# Patient Record
Sex: MALE | Race: BLACK OR AFRICAN AMERICAN
[De-identification: names, ages, dates, MRNs, and addresses within clinical notes are randomized per-mention and may not be internally consistent; named-entity substitution may affect disease eponyms.]

---

## 2020-03-22 ENCOUNTER — HOSPITAL ENCOUNTER (EMERGENCY)
Dept: HOSPITAL 72 - EMR | Age: 55
LOS: 1 days | Discharge: HOME | End: 2020-03-23
Payer: MEDICAID

## 2020-03-22 VITALS — BODY MASS INDEX: 23.54 KG/M2 | WEIGHT: 150 LBS | HEIGHT: 67 IN

## 2020-03-22 DIAGNOSIS — G31.9: ICD-10-CM
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
## 2020-03-22 DIAGNOSIS — F17.200: ICD-10-CM

## 2020-03-22 DIAGNOSIS — M40.204: ICD-10-CM

## 2020-03-22 DIAGNOSIS — N13.30: ICD-10-CM

## 2020-03-22 DIAGNOSIS — I10: ICD-10-CM

## 2020-03-22 DIAGNOSIS — M51.27: ICD-10-CM

## 2020-03-22 DIAGNOSIS — S22.029A: Primary | ICD-10-CM
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
## 2020-03-22 DIAGNOSIS — Z98.1: ICD-10-CM

## 2020-03-22 DIAGNOSIS — I99.8: ICD-10-CM

## 2020-03-22 DIAGNOSIS — K57.90: ICD-10-CM

## 2020-03-22 DIAGNOSIS — M43.17: ICD-10-CM

## 2020-03-22 PROCEDURE — 72131 CT LUMBAR SPINE W/O DYE: CPT

## 2020-03-22 PROCEDURE — 99284 EMERGENCY DEPT VISIT MOD MDM: CPT

## 2020-03-22 PROCEDURE — 70450 CT HEAD/BRAIN W/O DYE: CPT

## 2020-03-22 PROCEDURE — 72128 CT CHEST SPINE W/O DYE: CPT

## 2020-03-22 PROCEDURE — 74176 CT ABD & PELVIS W/O CONTRAST: CPT

## 2020-03-22 NOTE — DIAGNOSTIC IMAGING REPORT
Indication: Headache

 

Technique: Contiguous 5 mm thick transaxial imaging of the head obtained in a Siemens

Sensation 64 slice CT scanner.  Soft tissue and bone windows generated.  Automatic

Exposure Control was utilized.

 

 

Total Dose length Product (DLP):  1045.5mGycm

 

CT Dose Index Volume (CTDIvol):   53.4 mGy

 

Comparison: none

 

Findings: There is mild prominence of the ventricles, basal cisterns, and cerebral

sulci consistent with atrophy. Mild, nonspecific, white matter hypoattenuation is

noted throughout the brain consistent with chronic small vessel disease. 

 

There is no midline shift, edema, acute hemorrhage, mass effect, or abnormal

extra-axial fluid collections. Bones are unremarkable.

 

Impression: No acute intracranial bleed, mass effect or edema.

 

Mild atrophy of the brain.

 

Nonspecific white matter hypoattenuation probably due to chronic small vessel

disease.

 

[facility] has communicated the preliminary results to the Emergency

Department.  Their findings are largely concordant with this report.

 

 

 

The CT scanner at [facility] is accredited by the American College of

Radiology and the scans are performed using dose optimization techniques as

appropriate to a performed exam including Automatic Exposure control.

## 2020-03-22 NOTE — DIAGNOSTIC IMAGING REPORT
INDICATION: Abdominal pain

 

TECHNIQUE: Continuous helical transaxial imaging of the abdomen and pelvis was

obtained from the lung bases to the pubic symphysis. No intravenous contrast was

administered. Coronal 2-D reformats were also obtained. Automatic Exposure Control

was utilized.

 

 

Total Dose length Product (DLP):  512.5 mGycm

 

CT Dose Index Volume (CTDIvol):   10.3 mGy

 

Comparison: none

 

FINDINGS: 

 

Lungs: The visualized lung bases are clear.

 

Liver: Grossly unremarkable

 

Gallbladder/biliary system: No gallstones are identified. There is no evidence of

intrahepatic or extrahepatic biliary ductal dilatation.

 

Spleen: Unremarkable

 

Pancreas: Unremarkable

 

Kidneys/Bladder: There is minimal right hydronephrosis without evidence of an

obstructing stone. There are no renal stones. The bladder is unremarkable..

 

Adrenal glands: Unremarkable

 

Bowel: Appendix is normal. There is no evidence of bowel obstruction. Diverticula

noted in the colon.

 

Aorta/IVC: Moderate calcification of aorta and iliac arteries demonstrated. There is

no aneurysm.

 

Peritoneum: There is no free fluid.

 

Bones: There is narrowing of intervertebral discs and accompanying endplate

osteophyte formation.  Hypertrophied facet joints also demonstrated. There is an

grade 2 anterolisthesis of L5 on S1 with bilateral pars interarticularis defects at

L5.

 

IMPRESSION:

 

Minimal right hydronephrosis. No renal calculus identified. Finding may be spurious.

Possibility of a recently passed stone or pyelonephritis may be considered. Correlate

clinically.

 

Diverticulosis of the colon.

 

Atherosclerotic vascular disease

 

Grade 2 spondylolisthesis L5 on S1. L5 spondylolysis.

 

 

 

Note:  Evaluation of solid organs is limited on non contrast imaging.

 

 

 

The CT scanner at [facility] is accredited by the American College of

Radiology and the scans are performed using dose optimization techniques as

appropriate to a performed exam including Automatic Exposure control.

## 2020-03-22 NOTE — DIAGNOSTIC IMAGING REPORT
Indication: Back pain

 

Technique: Continuous helical transaxial imaging of the lumbar spine was obtained.  

Coronal 2-D reformats were also obtained. Study obtained in a Siemens sensation 64

slice CT. 

 

Total Dose length Product (DLP):  494.8 mGycm

 

CT Dose Index Volume (CTDIvol):   14.2 mGy

 

 

Comparison: None

 

Findings: No acute fracture is identified. The L5-S1 level notable for grade 2

spondylolisthesis. There are bilateral moderately displaced pars interarticularis

defects at L5. Sclerosis and hypertrophy of the facets noted at this level. There is

narrowing and vacuum phenomenon of the L5-S1 disc. Mild marginal endplate spurs also

demonstrated throughout the lumbar spine and visualized lower thoracic spine. The

aorta is moderately calcified. Iliac artery calcifications noted.

 

IMPRESSION:

 

No acute injury appreciated.

 

Grade 2 spondylolisthesis L5 on S1 with moderate disc disease and facet arthropathy.

Bilateral displaced L5 spondylolysis.

 

[facility] has communicated the preliminary results to the Emergency

Department.  Their findings are largely concordant with this report.

 

 

 

The CT scanner at [facility] is accredited by the American College of

Radiology and the scans are performed using dose optimization techniques as

appropriate to a performed exam including Automatic Exposure control.

## 2020-03-22 NOTE — NUR
ED Nurse Note:



Recieved pt BIBA from [facility] with c/o mechanical fall, pt was found on curb, 
pt is awake, alert and oriented x 4, pt states he slipped on curb and could not 
get up and was laying there about 3 hours, pt has hx of spinal and neck surgery 
in december 2019, pt has severe fine tremors and states does drink alcohol, pt 
is completely soiled, all clothing removed, pt gowned, placed on cardiac 
monitoirng, will resume care as ordered, pt c/o pain to left hip and back area.
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
## 2020-03-23 VITALS — SYSTOLIC BLOOD PRESSURE: 136 MMHG | DIASTOLIC BLOOD PRESSURE: 81 MMHG

## 2020-03-23 VITALS — SYSTOLIC BLOOD PRESSURE: 129 MMHG | DIASTOLIC BLOOD PRESSURE: 90 MMHG

## 2020-03-23 NOTE — NUR
ED Nurse Note:



Pt has been dicaharged, allowed to sleep because homeless, will d/c in am when 
transportation is available, pt is sleeping, arouses easily, given water, 
tolerated well, pain level decreased, v/s stable, nad noted, will continue to 
monitor.

## 2020-08-25 ENCOUNTER — HOSPITAL ENCOUNTER (EMERGENCY)
Dept: HOSPITAL 72 - EMR | Age: 55
Discharge: ANOTHER HOSPITAL | End: 2020-08-25
Payer: MEDICAID

## 2020-08-25 VITALS — SYSTOLIC BLOOD PRESSURE: 188 MMHG | DIASTOLIC BLOOD PRESSURE: 123 MMHG

## 2020-08-25 VITALS — SYSTOLIC BLOOD PRESSURE: 197 MMHG | DIASTOLIC BLOOD PRESSURE: 121 MMHG

## 2020-08-25 VITALS — DIASTOLIC BLOOD PRESSURE: 111 MMHG | SYSTOLIC BLOOD PRESSURE: 155 MMHG

## 2020-08-25 VITALS — BODY MASS INDEX: 24.11 KG/M2 | HEIGHT: 66 IN | WEIGHT: 150 LBS

## 2020-08-25 VITALS — DIASTOLIC BLOOD PRESSURE: 122 MMHG | SYSTOLIC BLOOD PRESSURE: 186 MMHG

## 2020-08-25 DIAGNOSIS — G62.9: ICD-10-CM

## 2020-08-25 DIAGNOSIS — E83.42: ICD-10-CM

## 2020-08-25 DIAGNOSIS — M40.209: ICD-10-CM

## 2020-08-25 DIAGNOSIS — M51.24: ICD-10-CM

## 2020-08-25 DIAGNOSIS — K85.90: ICD-10-CM

## 2020-08-25 DIAGNOSIS — M47.817: ICD-10-CM

## 2020-08-25 DIAGNOSIS — D69.6: ICD-10-CM

## 2020-08-25 DIAGNOSIS — F10.239: Primary | ICD-10-CM

## 2020-08-25 DIAGNOSIS — I10: ICD-10-CM
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
## 2020-08-25 DIAGNOSIS — S80.211A: ICD-10-CM

## 2020-08-25 DIAGNOSIS — R00.0: ICD-10-CM

## 2020-08-25 DIAGNOSIS — R25.1: ICD-10-CM

## 2020-08-25 DIAGNOSIS — E83.52: ICD-10-CM

## 2020-08-25 DIAGNOSIS — E86.0: ICD-10-CM

## 2020-08-25 DIAGNOSIS — F41.9: ICD-10-CM

## 2020-08-25 LAB
ADD MANUAL DIFF: YES
ALBUMIN SERPL-MCNC: 4.2 G/DL (ref 3.4–5)
ALBUMIN/GLOB SERPL: 0.7 {RATIO} (ref 1–2.7)
ALP SERPL-CCNC: 65 U/L (ref 46–116)
ALT SERPL-CCNC: 62 U/L (ref 12–78)
ANION GAP SERPL CALC-SCNC: 21 MMOL/L (ref 5–15)
APTT BLD: 25 SEC (ref 23–33)
AST SERPL-CCNC: 237 U/L (ref 15–37)
BILIRUB SERPL-MCNC: 0.7 MG/DL (ref 0.2–1)
BUN SERPL-MCNC: 11 MG/DL (ref 7–18)
CALCIUM SERPL-MCNC: 10.2 MG/DL (ref 8.5–10.1)
CHLORIDE SERPL-SCNC: 98 MMOL/L (ref 98–107)
CK SERPL-CCNC: 466 U/L (ref 26–308)
CO2 SERPL-SCNC: 21 MMOL/L (ref 21–32)
CREAT SERPL-MCNC: 1.2 MG/DL (ref 0.55–1.3)
ERYTHROCYTE [DISTWIDTH] IN BLOOD BY AUTOMATED COUNT: 14.4 % (ref 11.6–14.8)
GLOBULIN SER-MCNC: 6 G/DL
HCT VFR BLD CALC: 43.5 % (ref 42–52)
HGB BLD-MCNC: 14.1 G/DL (ref 14.2–18)
INR PPP: 1 (ref 0.9–1.1)
MCV RBC AUTO: 97 FL (ref 80–99)
PHOSPHATE SERPL-MCNC: 3.7 MG/DL (ref 2.5–4.9)
PLATELET # BLD: 81 K/UL (ref 150–450)
POTASSIUM SERPL-SCNC: 3.9 MMOL/L (ref 3.5–5.1)
RBC # BLD AUTO: 4.47 M/UL (ref 4.7–6.1)
SODIUM SERPL-SCNC: 140 MMOL/L (ref 136–145)
WBC # BLD AUTO: 4.5 K/UL (ref 4.8–10.8)

## 2020-08-25 PROCEDURE — 80053 COMPREHEN METABOLIC PANEL: CPT

## 2020-08-25 PROCEDURE — 73562 X-RAY EXAM OF KNEE 3: CPT

## 2020-08-25 PROCEDURE — 74176 CT ABD & PELVIS W/O CONTRAST: CPT

## 2020-08-25 PROCEDURE — 82550 ASSAY OF CK (CPK): CPT

## 2020-08-25 PROCEDURE — 96366 THER/PROPH/DIAG IV INF ADDON: CPT

## 2020-08-25 PROCEDURE — 84100 ASSAY OF PHOSPHORUS: CPT

## 2020-08-25 PROCEDURE — 36415 COLL VENOUS BLD VENIPUNCTURE: CPT

## 2020-08-25 PROCEDURE — 96376 TX/PRO/DX INJ SAME DRUG ADON: CPT

## 2020-08-25 PROCEDURE — 72128 CT CHEST SPINE W/O DYE: CPT

## 2020-08-25 PROCEDURE — 85007 BL SMEAR W/DIFF WBC COUNT: CPT

## 2020-08-25 PROCEDURE — 83690 ASSAY OF LIPASE: CPT

## 2020-08-25 PROCEDURE — 85025 COMPLETE CBC W/AUTO DIFF WBC: CPT

## 2020-08-25 PROCEDURE — 83735 ASSAY OF MAGNESIUM: CPT

## 2020-08-25 PROCEDURE — 85730 THROMBOPLASTIN TIME PARTIAL: CPT

## 2020-08-25 PROCEDURE — 72125 CT NECK SPINE W/O DYE: CPT

## 2020-08-25 PROCEDURE — 90471 IMMUNIZATION ADMIN: CPT

## 2020-08-25 PROCEDURE — 96375 TX/PRO/DX INJ NEW DRUG ADDON: CPT

## 2020-08-25 PROCEDURE — 90715 TDAP VACCINE 7 YRS/> IM: CPT

## 2020-08-25 PROCEDURE — 70450 CT HEAD/BRAIN W/O DYE: CPT

## 2020-08-25 PROCEDURE — 96365 THER/PROPH/DIAG IV INF INIT: CPT

## 2020-08-25 PROCEDURE — 84484 ASSAY OF TROPONIN QUANT: CPT

## 2020-08-25 PROCEDURE — 72131 CT LUMBAR SPINE W/O DYE: CPT

## 2020-08-25 PROCEDURE — 85610 PROTHROMBIN TIME: CPT

## 2020-08-25 PROCEDURE — 99285 EMERGENCY DEPT VISIT HI MDM: CPT

## 2020-08-25 PROCEDURE — 93005 ELECTROCARDIOGRAM TRACING: CPT

## 2020-08-25 NOTE — DIAGNOSTIC IMAGING REPORT
Indication: Pain, fall, lower and upper extremity right greater than left weakness

and cramping

 

Technique: Spiral acquisitions obtained through the thoracic spine. No IV contrast

utilized. Multiplanar reconstructions were generated.  Total dose length product 436

mGycm. CTDIvol(s) 10 mGy.  Dose reduction achieved using automated exposure control

 

 

Comparison: 3/22/2020

 

Findings: Since prior exam, there is increased acute focal kyphotic angulation at

T1-T2, and the anterior corner of the T1 vertebral body now impacts the superior

endplate of the T2 vertebral body. The anterior corner is displaced posteriorly by 8

6 mm from the posterior corner. The posterior corner of the T1 vertebral body is

likewise displaced approximately 8 mm posteriorly relative to the T2 vertebral body.

There is interim significant loss of the T1-T2 disc space, considerable interim

vacuum formation, and considerable irregularity of the inferior T1 and superior T2

endplates. There also appears to be increased proliferative change. There is

questionable disc material protruding posterior to the posterior inferior corner of

T1, further narrowing the spinal canal, although this could be artifactual as there

is considerable streak and beam hardening artifact in this area. On the axial views,

the spinal canal bone appears borderline narrowing, about 10 mm, but appears more

significantly narrowed, possibly 8 mm, on the sagittal reconstructed images.

 

Again demonstrated is a fracture of the left T1 transverse process. Again

demonstrated is a fracture of the left T1 lamina. This is also demonstrated

previously, fracture margins appearing somewhat more irregular than on the prior

exam. Alignment is stable. There is what appears to be a fracture of the right T1

pedicle, also evident previously, more apparent on this exam than on a cervical spine

CT performed at same time. Again demonstrated is a fracture of the right T1

transverse process, unhealed but alignment stable. 

 

There is evidence of interim healing of previously reported left T2 transverse

process fracture. As mentioned above, there is T2 endplate irregularity related to

the T1 subluxation. The T2 segment is intact otherwise.

 

The remaining vertebral segments demonstrate no evidence of fracture. The remaining

bony alignment is normal.

 

At T9-10, there is a small posterior central osteophyte which may impinge slightly on

the anterior spinal canal but does not significantly narrow it.

 

At T7-8, there is a right paracentral posterior osteophyte which may impinge slightly

on the right lateral aspect of the spinal canal but does not significantly narrow it

 

At the remaining disc levels, no significant disc bulge or protrusion, spinal

stenosis, or neural foraminal stenosis.

 

The included extraspinal soft tissues are unremarkable.

 

Impression: Since 3/22/2020, interim posterior displacement of the T2 vertebral body

posteriorly relative to T3 by about 8 mm, and interim development of acute kyphotic

angulation at this level. This is probably not acute, as there is considerable

secondary degenerative change of T2 and T3 which has developed in the interim. The

posterior T1 vertebral body inferior corner protrudes into the spinal canal as a

result. This by itself results in only borderline stenosis of the spinal canal, but

there may be disc material (versus artifact) also protruding posteriorly which if

real results in moderate narrowing of the spinal canal at this level.

 

T1 posterior element fractures, mostly ununited, as detailed above. There is probably

an ununited right pedicular fracture, better demonstrated on this study than on

separate CT cervical spine

 

Mild degenerative changes, as detailed on a level by level basis above

 

Findings previously discussed by phone with Dr. Cohn in the emergency room.

 

The CT scanner at [facility] is accredited by the American College of

Radiology and the scans are performed using protocols designed to limit radiation

exposure to as low as reasonably achievable to attain images of sufficient resolution

adequate for diagnostic evaluation.
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
## 2020-08-25 NOTE — DIAGNOSTIC IMAGING REPORT
Indications: Altered mental status, status post fall

 

Technique: Spiral acquisitions obtained through the brain. Angled axial and coronal 5

x 5 mm slices were reconstructed. Total dose length product 1072 mGycm.  CTDI vol(s)

53 mGy. Dose reduction achieved using automated exposure control

 

Comparison: 3/22/2020

 

Findings: There is enlargement of the ventricles and extra-axial CSF spaces, out of

proportion to patient's age. There is periventricular deep white matter

low-attenuation, consistent with chronic microvascular ischemic change. No acute

intracranial hemorrhage or edema. No mass effect nor midline shift. Visualized orbits

and sinuses are unremarkable. The mastoids are clear. The calvarium is intact. Fusion

hardware is seen in the upper cervical spine No significant interim change

 

Impression: Cortical and central volume loss, also previously reported, out of

proportion to age

 

Negative for acute intracranial bleed or mass effect

 

Periventricular deep white matter hypoattenuation, consistent with chronic

microvascular ischemic change

 

 

 

The CT scanner at [facility] is accredited by the [employer] College of

Radiology and the scans are performed using protocols designed to limit radiation

exposure to as low as reasonably achievable to attain images of sufficient resolution

adequate for diagnostic evaluation.

## 2020-08-25 NOTE — DIAGNOSTIC IMAGING REPORT
Indication: Pain, status post fall

 

Technique: Spiral acquisitions obtained through the cervical spine. No IV contrast

utilized. Multiplanar reconstructions were generated.  Total dose length product 514

mGycm. CTDIvol(s) 20 mGy. Dose reduction achieved using automated exposure control.

 

 

Comparison: None. Reference is made to prior thoracic spine CT of 3/22/2020

 

Findings: The patient is status post posterior fusion, with fusion hardware extending

from C2 through T1. There is also evidence of laminectomies of C3-T1. Since the prior

exam, there is increased acute focal kyphotic angulation at T1-T2, and the anterior

corner of the T1 vertebral body now impacts the superior endplate of the T2 vertebral

body. The anterior corner is displaced posteriorly by 8 6 mm from the posterior

corner. The posterior corner of the T1 vertebral body is likewise displaced

approximately 8 mm posteriorly relative to the T2 vertebral body. There is interim

significant loss of the T1-T2 disc space, considerable interim vacuum formation, and

considerable irregularity of the inferior T1 and superior T2 endplates. There also

appears to be increased proliferative change. There is questionable disc material

protruding posterior to the posterior inferior corner of T1, further narrowing the

spinal canal, although this could be artifactual as there is considerable streak and

beam hardening artifact in this area. On the axial views, the spinal canal bone

appears borderline narrowing, about 10 mm, but appears more significantly narrowed,

possibly 8 mm, on the sagittal reconstructed images.

 

Again demonstrated is a fracture of the left T1 transverse process. Again

demonstrated is a fracture of the left T1 lamina. This is also demonstrated

previously, fracture margins appearing somewhat more irregular than on the prior

exam. Alignment is stable. Prior exam demonstrates a questionable right T1 pedicular

fracture. This appears to be evident on CT of the thoracic spine performed at the

same time as this exam, but is less apparent on the cervical spine CT.. Again

demonstrated is a fracture of the right T1 transverse process, unhealed but alignment

stable. 

 

At the remaining levels, the fusion hardware for the most part appears well aligned,

intrapedicular, without significant foraminal encroachment no pedicle screws are seen

at C7, and the T1 pedicle screws extend all the way into the anterior vertebral body.

The right screw appears to be entirely interpedicular. The left screw may encroach

slightly into the neural foramen, but probably not a significant extent. The

alignment of the cervical segments appears normal. No evidence of acute cervical

fracture. There is degenerative narrowing of the C6-7 and C7-T1 discs. Artifact from

the hardware obscures the spinal canal, and therefore spinal stenosis at any level

cannot be ruled out although most of the cervical spine is decompressed. High

attenuation is seen posterior to the cord at the C3 level. It is not completely clear

whether this is artifact or bone; most likely the former, but if the latter, this may

narrow the spinal canal to 7 mm at this level.

 

At C4-5, there is moderate narrowing of the left neural foramen due to facet

arthrosis.

 

At C5-6, there is moderate to severe left and mild-to-moderate right neural foraminal

stenosis due to facet arthrosis.

 

At C6-7, there is mild bilateral neural foraminal stenosis. At arthrosis.

 

Included extra spinal soft tissues are unremarkable.

 

Impression: Since 3/22/2020, interim posterior displacement of the T2 vertebral body

posteriorly relative to T3 by about 8 mm, and interim development of acute kyphotic

angulation at this level. This is probably not acute, as there is considerable

secondary degenerative change of T2 and T3 which has developed in the interim. The

posterior T1 vertebral body inferior corner protrudes into the spinal canal as a

result. This by itself results in only borderline stenosis of the spinal canal, but

there may be disc material (versus artifact) also protruding posteriorly which if

real results in moderate narrowing of the spinal canal at this level.

 

Extensive postsurgical changes, of the cervical and upper thoracic spine, as detailed

above

 

T1 posterior element fractures, mostly ununited, as detailed above. There is

questionably an ununited right pedicular fracture, better demonstrated on separate

thoracic spine CT, suspect real

 

Suboptimal visualization of the spinal canal, due to streak artifact from the

hardware. Doubt significant spinal stenosis but spinal stenosis at C3 level cannot be

completely ruled out.

 

Findings discussed by phone with Dr. Cohn in the emergency room at the time of

interpretation

 

The CT scanner at [facility] is accredited by the American College of

[employer]iology and the scans are performed using protocols designed to limit radiation

exposure to as low as reasonably achievable to attain images of sufficient resolution

adequate for diagnostic evaluation.

## 2020-08-25 NOTE — DIAGNOSTIC IMAGING REPORT
Indications: Trauma, pain, status post fall

 

Technique: Spiral acquisitions obtained through the lumbar spine. Multiplanar

reconstructions were generated. No IV contrast utilized. Total dose length product

273 mGycm. CTDIvol(s) 5 mGy.  Dose reduction achieved using automated exposure

control

 

 

Comparison: 3/22/2020  

 

Findings: There is bilateral L5 spondylolysis. There is grade 2 L5 on S1

spondylolisthesis and secondary degenerative change. The degree of spondylolisthesis

appears slightly increased from the previous study. The degenerative changes appear

similar.

 

The remaining bony alignment is normal. Vertebral body heights are preserved. The

remaining disc spaces are preserved, although there is mild vacuum formation at L3-4.

There is multilevel degenerative proliferative change. No acute fractures. There are

degenerative changes of the sacroiliac joints.

 

At L3-4, there is circumferential annular bulge. This this does not result in any

significant spinal canal compromise, however. This may slightly compromise bilateral

neural foramina.

 

At L4-5, there is circumferential annular bulge. This does not significantly narrow

the spinal canal, may slightly compromise bilateral neural foramina.

 

At L5-S1, the alignment abnormality does not appear to significantly compromise the

spinal canal. However, there may be moderate compromise of the bilateral neural

foramina as a result.

 

The included extraspinal soft tissues are unremarkable.

 

Impression: No acute bony trauma

 

Bilateral L5 spondylolysis. Grade 2 spondylolisthesis of L5 and S1, slightly

progressive since previous exam. Associated secondary degenerative change

 

 

 

The CT scanner at [facility] is accredited by the American College of

Radiology and the scans are performed using protocols designed to limit radiation

exposure to as low as reasonably achievable to attain images of sufficient resolution

adequate for diagnostic evaluation.

## 2020-08-25 NOTE — DIAGNOSTIC IMAGING REPORT
Indication: Pain, status post fall

 

Technique: 3 views of the right knee

 

Comparison: None

 

Findings: Exam is limited, no true lateral view available. No acute fractures. No

dislocations. No suprapatellar effusion. There is minimal medial compartmental

degenerative joint space narrowing. The joint spaces are preserved otherwise.

 

Impression: Minimal degenerative changes. No acute bony trauma

## 2020-08-25 NOTE — DIAGNOSTIC IMAGING REPORT
Indication: Pain, status post fall

 

Technique: Spiral acquisitions obtained through the abdomen and pelvis. No oral

contrast utilized, per emergency room physician request No IV contrast utilized,  per

referring physician request.. Multiplanar reconstructions were generated. Total dose

length product 273 mGycm. CTDIvol(s) 5 mGy. Dose reduction achieved using automated

exposure control

 

 

Comparison: 3/22/2020

 

Findings: Lack of enteric contrast limits assessment of the GI tract. The appendix is

normal. No evidence of diverticulosis or diverticulitis. No small bowel distention.

No free or loculated intraperitoneal gas or fluid is evident. Distal esophagus

demonstrates a 10 x 6 mm radiopaque foreign body within the lumen at the

gastroesophageal junction.

 

There is a mass in the left upper quadrant of the abdomen which measures 6 cm long

axis dimension. This is anterior to the upper pole of the left kidney and medial to

the splenic flexure the colon. This is not evident previously. This demonstrates some

infiltration of the surrounding fat. There is a second mass in the left upper

quadrant, immediately adjacent to just above the splenic flexure the colon which is

bilobed, measures 3.5 cm long axis dimension. This is likewise not evident

previously. There is a prominent node measuring 2.4 x 11.8 cm just cephalad posterior

to the pancreatic head body junction which appears unchanged from the previous exam.

 

Lack of IV contrast limits assessment of the solid organs. The liver is diffusely

hypoattenuating, consistent with fatty change. No gross focal abnormality

demonstrated. The gallbladder, bile ducts, pancreas, spleen are unremarkable. A 7 mm

nodule is seen in the anterior limb of the left adrenal, probably evident previously.

There is bilateral perinephric fat stranding, which appears slightly more extensive

than on the prior exam. No renal or ureteral calculi, hydronephrosis, or hydroureter.

No pelvic mass or adenopathy. The bladder is unremarkable.

 

The included lung bases are clear. The bones are unremarkable for bilateral L5

spondylolysis, grade 1 L5 on S1 spondylolisthesis, and secondary degenerative

changes. There are degenerative changes of the lumbar spine

 

Impression: Limited exam, due to lack of enteric and IV contrast

 

2 masses in the left upper quadrant, as described, one possibly cystic, the other

solid. These are new since the previous exam. Consider contrast CT or MRI to better

characterize

 

Increased bilateral perinephric fat stranding, SPECT chronic renal inflammation is

not excludable

 

Bilateral L5 spondylolysis, grade 1 L5 on S1 spondylolisthesis and secondary

degenerative change

 

7 mm left adrenal nodule, too small to characterize, probably evident previously

 

Findings discussed by phone with Dr. Cohn in the emergency room at the time of

interpretation

 

The CT scanner at [facility] is accredited by the American College of

Radiology and the scans are performed using protocols designed to limit radiation

exposure to as low as reasonably achievable to attain images of sufficient resolution

adequate for diagnostic evaluation.

## 2020-09-03 ENCOUNTER — HOSPITAL ENCOUNTER (INPATIENT)
Dept: HOSPITAL 72 - EMR | Age: 55
LOS: 11 days | Discharge: NURSING, REHAB OR LONG-TERM CARE | DRG: 720 | End: 2020-09-14
Payer: MEDICAID

## 2020-09-03 VITALS — DIASTOLIC BLOOD PRESSURE: 95 MMHG | SYSTOLIC BLOOD PRESSURE: 175 MMHG

## 2020-09-03 VITALS — HEIGHT: 67 IN | BODY MASS INDEX: 27.37 KG/M2 | WEIGHT: 174.39 LBS

## 2020-09-03 VITALS — SYSTOLIC BLOOD PRESSURE: 163 MMHG | DIASTOLIC BLOOD PRESSURE: 96 MMHG

## 2020-09-03 DIAGNOSIS — I10: ICD-10-CM

## 2020-09-03 DIAGNOSIS — E44.1: ICD-10-CM

## 2020-09-03 DIAGNOSIS — N39.0: ICD-10-CM

## 2020-09-03 DIAGNOSIS — A41.9: Primary | ICD-10-CM

## 2020-09-03 DIAGNOSIS — S30.810A: ICD-10-CM

## 2020-09-03 DIAGNOSIS — I38: ICD-10-CM

## 2020-09-03 DIAGNOSIS — Z59.0: ICD-10-CM

## 2020-09-03 DIAGNOSIS — F10.239: ICD-10-CM

## 2020-09-03 DIAGNOSIS — R65.20: ICD-10-CM

## 2020-09-03 DIAGNOSIS — R53.1: ICD-10-CM

## 2020-09-03 DIAGNOSIS — T14.8XXA: ICD-10-CM

## 2020-09-03 DIAGNOSIS — B19.20: ICD-10-CM
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
## 2020-09-03 DIAGNOSIS — Z20.828: ICD-10-CM

## 2020-09-03 LAB
ADD MANUAL DIFF: NO
ALBUMIN SERPL-MCNC: 3 G/DL (ref 3.4–5)
ALBUMIN/GLOB SERPL: 0.6 {RATIO} (ref 1–2.7)
ALP SERPL-CCNC: 57 U/L (ref 46–116)
ALT SERPL-CCNC: 40 U/L (ref 12–78)
ANION GAP SERPL CALC-SCNC: 12 MMOL/L (ref 5–15)
APPEARANCE UR: CLEAR
APTT PPP: (no result) S
AST SERPL-CCNC: 46 U/L (ref 15–37)
BASOPHILS NFR BLD AUTO: 1.4 % (ref 0–2)
BILIRUB SERPL-MCNC: 0.2 MG/DL (ref 0.2–1)
BUN SERPL-MCNC: 17 MG/DL (ref 7–18)
CALCIUM SERPL-MCNC: 10.2 MG/DL (ref 8.5–10.1)
CHLORIDE SERPL-SCNC: 103 MMOL/L (ref 98–107)
CO2 SERPL-SCNC: 22 MMOL/L (ref 21–32)
CREAT SERPL-MCNC: 1.2 MG/DL (ref 0.55–1.3)
EOSINOPHIL NFR BLD AUTO: 0.2 % (ref 0–3)
ERYTHROCYTE [DISTWIDTH] IN BLOOD BY AUTOMATED COUNT: 14.6 % (ref 11.6–14.8)
GLOBULIN SER-MCNC: 5.4 G/DL
GLUCOSE UR STRIP-MCNC: NEGATIVE MG/DL
HCT VFR BLD CALC: 36.1 % (ref 42–52)
HGB BLD-MCNC: 12.1 G/DL (ref 14.2–18)
KETONES UR QL STRIP: (no result)
LEUKOCYTE ESTERASE UR QL STRIP: NEGATIVE
LYMPHOCYTES NFR BLD AUTO: 4.1 % (ref 20–45)
MCV RBC AUTO: 95 FL (ref 80–99)
MONOCYTES NFR BLD AUTO: 11.6 % (ref 1–10)
NEUTROPHILS NFR BLD AUTO: 82.6 % (ref 45–75)
NITRITE UR QL STRIP: NEGATIVE
PH UR STRIP: 5 [PH] (ref 4.5–8)
PLATELET # BLD: 258 K/UL (ref 150–450)
POTASSIUM SERPL-SCNC: 3.7 MMOL/L (ref 3.5–5.1)
PROT UR QL STRIP: (no result)
RBC # BLD AUTO: 3.78 M/UL (ref 4.7–6.1)
SODIUM SERPL-SCNC: 137 MMOL/L (ref 136–145)
SP GR UR STRIP: 1.01 (ref 1–1.03)
UROBILINOGEN UR-MCNC: NORMAL MG/DL (ref 0–1)
WBC # BLD AUTO: 17.7 K/UL (ref 4.8–10.8)

## 2020-09-03 PROCEDURE — 81001 URINALYSIS AUTO W/SCOPE: CPT

## 2020-09-03 PROCEDURE — 70553 MRI BRAIN STEM W/O & W/DYE: CPT

## 2020-09-03 PROCEDURE — 87536 HIV-1 QUANT&REVRSE TRNSCRPJ: CPT

## 2020-09-03 PROCEDURE — 86803 HEPATITIS C AB TEST: CPT

## 2020-09-03 PROCEDURE — 99291 CRITICAL CARE FIRST HOUR: CPT

## 2020-09-03 PROCEDURE — 96365 THER/PROPH/DIAG IV INF INIT: CPT

## 2020-09-03 PROCEDURE — 87081 CULTURE SCREEN ONLY: CPT

## 2020-09-03 PROCEDURE — 85025 COMPLETE CBC W/AUTO DIFF WBC: CPT

## 2020-09-03 PROCEDURE — 93306 TTE W/DOPPLER COMPLETE: CPT

## 2020-09-03 PROCEDURE — 83605 ASSAY OF LACTIC ACID: CPT

## 2020-09-03 PROCEDURE — 87086 URINE CULTURE/COLONY COUNT: CPT

## 2020-09-03 PROCEDURE — 86703 HIV-1/HIV-2 1 RESULT ANTBDY: CPT

## 2020-09-03 PROCEDURE — 86140 C-REACTIVE PROTEIN: CPT

## 2020-09-03 PROCEDURE — 87590 N.GONORRHOEAE DNA DIR PROB: CPT

## 2020-09-03 PROCEDURE — 76937 US GUIDE VASCULAR ACCESS: CPT

## 2020-09-03 PROCEDURE — 84100 ASSAY OF PHOSPHORUS: CPT

## 2020-09-03 PROCEDURE — 86709 HEPATITIS A IGM ANTIBODY: CPT

## 2020-09-03 PROCEDURE — 93005 ELECTROCARDIOGRAM TRACING: CPT

## 2020-09-03 PROCEDURE — 85651 RBC SED RATE NONAUTOMATED: CPT

## 2020-09-03 PROCEDURE — 96375 TX/PRO/DX INJ NEW DRUG ADDON: CPT

## 2020-09-03 PROCEDURE — 87040 BLOOD CULTURE FOR BACTERIA: CPT

## 2020-09-03 PROCEDURE — 87340 HEPATITIS B SURFACE AG IA: CPT

## 2020-09-03 PROCEDURE — 86705 HEP B CORE ANTIBODY IGM: CPT

## 2020-09-03 PROCEDURE — 86592 SYPHILIS TEST NON-TREP QUAL: CPT

## 2020-09-03 PROCEDURE — 82150 ASSAY OF AMYLASE: CPT

## 2020-09-03 PROCEDURE — 87205 SMEAR GRAM STAIN: CPT

## 2020-09-03 PROCEDURE — 85007 BL SMEAR W/DIFF WBC COUNT: CPT

## 2020-09-03 PROCEDURE — 81003 URINALYSIS AUTO W/O SCOPE: CPT

## 2020-09-03 PROCEDURE — 87070 CULTURE OTHR SPECIMN AEROBIC: CPT

## 2020-09-03 PROCEDURE — 80202 ASSAY OF VANCOMYCIN: CPT

## 2020-09-03 PROCEDURE — 36569 INSJ PICC 5 YR+ W/O IMAGING: CPT

## 2020-09-03 PROCEDURE — 87491 CHLMYD TRACH DNA AMP PROBE: CPT

## 2020-09-03 PROCEDURE — 87181 SC STD AGAR DILUTION PER AGT: CPT

## 2020-09-03 PROCEDURE — 80048 BASIC METABOLIC PNL TOTAL CA: CPT

## 2020-09-03 PROCEDURE — 36415 COLL VENOUS BLD VENIPUNCTURE: CPT

## 2020-09-03 PROCEDURE — 83690 ASSAY OF LIPASE: CPT

## 2020-09-03 PROCEDURE — 70450 CT HEAD/BRAIN W/O DYE: CPT

## 2020-09-03 PROCEDURE — 71045 X-RAY EXAM CHEST 1 VIEW: CPT

## 2020-09-03 PROCEDURE — 80053 COMPREHEN METABOLIC PANEL: CPT

## 2020-09-03 PROCEDURE — 84484 ASSAY OF TROPONIN QUANT: CPT

## 2020-09-03 PROCEDURE — 83735 ASSAY OF MAGNESIUM: CPT

## 2020-09-03 SDOH — ECONOMIC STABILITY - HOUSING INSECURITY: HOMELESSNESS: Z59.0

## 2020-09-03 NOTE — NUR
TRANSFER TO FLOOR:



Patient transferred to ICU as ordered, per ERMD.   Report given to DA Nieves. 
Went over all belongings with DA Nieves. Medications logged in ER med room. 
Patient transported via gurney with ACLS protocol on cardiac monitor in stable 
condition accompanied by 1 RN and ER tech.

## 2020-09-03 NOTE — EMERGENCY ROOM REPORT
History of Present Illness


General


Chief Complaint:  Shortness of breath


Source:  Patient





Present Illness


HPI


55-year-old male history of alcohol abuse quit alcohol use 2 weeks ago presents 

with fever chills  palpitations, started today, patient was recently discharged 

yesterday from outside hospital, patient reports dry cough, no chest pain or 

shortness of breath no dysuria, no abdominal pain no known aggravating 

relieving factors severity is moderate, constant patient presents for 

evaluation and treatment.


Allergies:  


Coded Allergies:  


     No Known Allergies (Unverified , 11/18/15)





COVID-19 Screening


Contact w/high risk pt:  No


Recent Travel to affected area:  No


Experienced COVID-19 symptoms?:  No





Patient History


Past Medical History:  see triage record


Reviewed Nursing Documentation:  PMH: Agreed; PSxH: Agreed





Nursing Documentation-PMH


Hx Hypertension:  Yes





Review of Systems


All Other Systems:  negative except mentioned in HPI





Physical Exam


Febrile, heart rate greater than 100


Sp02 EP Interpretation:  reviewed, normal


General Appearance:  alert, moderate distress


Head:  normocephalic, atraumatic


Eyes:  bilateral eye PERRL, bilateral eye EOMI


ENT:  uvula midline, dry mucus membranes


Neck:  supple, thyroid normal, supple/symm/no masses


Respiratory:  lungs clear, no respiratory distress, no retraction, no accessory 

muscle use


Cardiovascular #1:  normal peripheral pulses, no edema, no gallop, no murmur, 

tachycardia


Gastrointestinal:  non tender, soft, no guarding, no rebound


Musculoskeletal:  normal inspection


Neurologic:  alert, oriented x3


Psychiatric:  mood/affect normal


Skin:  no rash, warm/dry





Procedures


Critical Care Time


Critical Care Time


Given the critical condition in which the patient arrived, the patient was 

immediately assessed by myself and the nurse, and cardiac monitoring initiated 

due to the potential for rapid decompensation of the patient's clinical 

condition. During the course of the patient's stay, I spent a considerable 

amount of time at the bedside performing serial re-evaluations of the patient's 

hemodynamic and clinical status because of the recognized potential threat to 

life or limb in this condition. I then had a chance to review not only all of 

the available current laboratory and radiographic studies obtained today, but I 

also reviewed old records available to me at the time. Additionally, any 

ancillary information available including paramedic records were reviewed. 

Sequential vital signs were obtained. 





Critical Care time of 37 minutes was performed exclusive of billable procedures.





Medical Decision Making


Diagnostic Impression:  


 Primary Impression:  


 Sepsis


 Qualified Codes:  A41.9 - Sepsis, unspecified organism


 Additional Impression:  


 Alcohol withdrawal


 Qualified Codes:  F10.230 - Alcohol dependence with withdrawal, uncomplicated


ER Course


Please note this chart was retrospectively transcribed after downtime 

resolution 


55-year-old male presents with alcohol withdrawal, possible sepsis, tachycardic

, hypertensive.  Patient states last drink was 7 days ago patient apparently is 

a heavy consumptive alcohol abuser cannot remember if he has a history of 

alcohol withdrawal


Patient during his hospitalization began to be hypertensive in the 200s 

tachycardic to the 160s, becoming more altered patient required 230 mg of 

phenobarbital in addition to the 4 mg of Ativan patient was started on broad-

spectrum antibiotics given 3 L of fluid cefepime and Vanco were started


COVID was negative, plan for admission to ICU, Versed drip will be started


Patient admitted to Dr. Marrufo





Laboratory Tests








Test


  9/3/20


18:10 9/3/20


18:40 9/3/20


20:17


 


Serum Alcohol < 3 mg/dL    


 


White Blood Count


  


  17.7 K/UL


(4.8-10.8)  H 


 


 


Red Blood Count


  


  3.78 M/UL


(4.70-6.10)  L 


 


 


Hemoglobin


  


  12.1 G/DL


(14.2-18.0)  L 


 


 


Hematocrit


  


  36.1 %


(42.0-52.0)  L 


 


 


Mean Corpuscular Volume  95 FL (80-99)   


 


Mean Corpuscular Hemoglobin


  


  32.0 PG


(27.0-31.0)  H 


 


 


Mean Corpuscular Hemoglobin


Concent 


  33.5 G/DL


(32.0-36.0) 


 


 


Red Cell Distribution Width


  


  14.6 %


(11.6-14.8) 


 


 


Platelet Count


  


  258 K/UL


(150-450) 


 


 


Mean Platelet Volume


  


  7.8 FL


(6.5-10.1) 


 


 


Neutrophils (%) (Auto)


  


  82.6 %


(45.0-75.0)  H 


 


 


Lymphocytes (%) (Auto)


  


  4.1 %


(20.0-45.0)  L 


 


 


Monocytes (%) (Auto)


  


  11.6 %


(1.0-10.0)  H 


 


 


Eosinophils (%) (Auto)


  


  0.2 %


(0.0-3.0) 


 


 


Basophils (%) (Auto)


  


  1.4 %


(0.0-2.0) 


 


 


Sodium Level


  


  137 MMOL/L


(136-145) 


 


 


Potassium Level


  


  3.7 MMOL/L


(3.5-5.1) 


 


 


Chloride Level


  


  103 MMOL/L


() 


 


 


Carbon Dioxide Level


  


  22 MMOL/L


(21-32) 


 


 


Anion Gap


  


  12 mmol/L


(5-15) 


 


 


Blood Urea Nitrogen


  


  17 mg/dL


(7-18) 


 


 


Creatinine


  


  1.2 MG/DL


(0.55-1.30) 


 


 


Estimated Glomerular


Filtration Rate 


  > 60 mL/min


(>60) 


 


 


Glucose Level


  


  114 MG/DL


()  H 


 


 


Lactic Acid Level


  


  0.50 mmol/L


(0.4-2.0) 


 


 


Calcium Level


  


  10.2 MG/DL


(8.5-10.1)  H 


 


 


Total Bilirubin


  


  0.2 MG/DL


(0.2-1.0) 


 


 


Aspartate Amino Transferase


(AST) 


  46 U/L (15-37)


H 


 


 


Alanine Aminotransferase (ALT)


  


  40 U/L (12-78)


  


 


 


Alkaline Phosphatase


  


  57 U/L


() 


 


 


Troponin I


  


  0.000 ng/mL


(0.000-0.056) 


 


 


Total Protein


  


  8.4 G/DL


(6.4-8.2)  H 


 


 


Albumin


  


  3.0 G/DL


(3.4-5.0)  L 


 


 


Globulin  5.4 g/dL   


 


Albumin/Globulin Ratio


  


  0.6 (1.0-2.7)


L 


 


 


Urine Color   Pale yellow  


 


Urine Appearance   Clear  


 


Urine pH   5.0 (4.5-8.0)  


 


Urine Specific Gravity


  


  


  1.010


(1.005-1.035)


 


Urine Protein


  


  


  1+ (NEGATIVE)


H


 


Urine Glucose (UA)


  


  


  Negative


(NEGATIVE)


 


Urine Ketones


  


  


  1+ (NEGATIVE)


H


 


Urine Blood


  


  


  Negative


(NEGATIVE)


 


Urine Nitrite


  


  


  Negative


(NEGATIVE)


 


Urine Bilirubin


  


  


  Negative


(NEGATIVE)


 


Urine Urobilinogen


  


  


  Normal MG/DL


(0.0-1.0)


 


Urine Leukocyte Esterase


  


  


  Negative


(NEGATIVE)


 


Urine RBC


  


  


  0-2 /HPF (0 -


0)  H


 


Urine WBC


  


  


  10-15 /HPF (0


- 0)  H


 


Urine Squamous Epithelial


Cells 


  


  None /LPF


(NONE/OCC)


 


Urine Bacteria


  


  


  Occasional


/HPF (NONE)








Microbiology








 Date/Time


Source Procedure


Growth Status


 


 


 9/3/20 18:40


Nasopharynx SARS-CoV-2 RdRp Gene Assay - Final Complete








EKG Diagnostic Results


EKG Time:  18:11


EP Interpretation:  Sinus tachycardia, rate 132, QTc 403, no acute ST elevations

, normal axis





Rhythm Strip Diag. Results


Rhythm Strip Time:  21:24


EP Interpretation:  yes


Rate:  143


Rhythm:  other - Sinus tachycardia





Chest X-Ray Diagnostic Results


Chest X-Ray Diagnostic Results :  


   Chest X-Ray Ordered:  Yes


   # of Views/Limited/Complete:  1 View


   Indication:  Chest Pain


   EP Interpretation:  Yes


   Interpretation:  no consolidation, no effusion, no pneumothorax, no acute 

cardiopulmonary disease


   Impression:  No acute disease


   Electronically Signed by:  Emory Small MD


Disposition:  ADMITTED AS INPATIENT


Condition:  Critical


Referrals:  


[facility],REFERRING (PCP)











Emory Small MD Sep 3, 2020 19:31

## 2020-09-03 NOTE — NUR
ED Nurse Note:



Recieved report from DA Del Rosario. Patient sleeping in bed, no acute distress 
noted.

## 2020-09-03 NOTE — NUR
ED Nurse Note:



Pt BIBA [id number] from [place] c/o alcohol withdrawal- tremors, unsteady gait. Pt was 
dc from [facility] yesterday for the same sx. AAOx4, verbally 
responsive. Temp 104.3F, orally. No SOB, on rooma ir. Warm to touch. Pt placed 
on cardiac monitor. ERMD at bedside.

## 2020-09-04 VITALS — SYSTOLIC BLOOD PRESSURE: 129 MMHG | DIASTOLIC BLOOD PRESSURE: 83 MMHG

## 2020-09-04 VITALS — DIASTOLIC BLOOD PRESSURE: 73 MMHG | SYSTOLIC BLOOD PRESSURE: 109 MMHG

## 2020-09-04 VITALS — SYSTOLIC BLOOD PRESSURE: 146 MMHG | DIASTOLIC BLOOD PRESSURE: 92 MMHG

## 2020-09-04 VITALS — SYSTOLIC BLOOD PRESSURE: 122 MMHG | DIASTOLIC BLOOD PRESSURE: 77 MMHG

## 2020-09-04 VITALS — DIASTOLIC BLOOD PRESSURE: 80 MMHG | SYSTOLIC BLOOD PRESSURE: 129 MMHG

## 2020-09-04 VITALS — SYSTOLIC BLOOD PRESSURE: 129 MMHG | DIASTOLIC BLOOD PRESSURE: 80 MMHG

## 2020-09-04 VITALS — SYSTOLIC BLOOD PRESSURE: 115 MMHG | DIASTOLIC BLOOD PRESSURE: 72 MMHG

## 2020-09-04 VITALS — DIASTOLIC BLOOD PRESSURE: 81 MMHG | SYSTOLIC BLOOD PRESSURE: 131 MMHG

## 2020-09-04 VITALS — SYSTOLIC BLOOD PRESSURE: 133 MMHG | DIASTOLIC BLOOD PRESSURE: 82 MMHG

## 2020-09-04 VITALS — DIASTOLIC BLOOD PRESSURE: 70 MMHG | SYSTOLIC BLOOD PRESSURE: 112 MMHG

## 2020-09-04 VITALS — DIASTOLIC BLOOD PRESSURE: 81 MMHG | SYSTOLIC BLOOD PRESSURE: 125 MMHG

## 2020-09-04 VITALS — SYSTOLIC BLOOD PRESSURE: 125 MMHG | DIASTOLIC BLOOD PRESSURE: 81 MMHG

## 2020-09-04 VITALS — DIASTOLIC BLOOD PRESSURE: 90 MMHG | SYSTOLIC BLOOD PRESSURE: 135 MMHG

## 2020-09-04 VITALS — DIASTOLIC BLOOD PRESSURE: 72 MMHG | SYSTOLIC BLOOD PRESSURE: 148 MMHG

## 2020-09-04 VITALS — DIASTOLIC BLOOD PRESSURE: 72 MMHG | SYSTOLIC BLOOD PRESSURE: 137 MMHG

## 2020-09-04 VITALS — DIASTOLIC BLOOD PRESSURE: 79 MMHG | SYSTOLIC BLOOD PRESSURE: 125 MMHG

## 2020-09-04 VITALS — DIASTOLIC BLOOD PRESSURE: 85 MMHG | SYSTOLIC BLOOD PRESSURE: 140 MMHG

## 2020-09-04 VITALS — SYSTOLIC BLOOD PRESSURE: 134 MMHG | DIASTOLIC BLOOD PRESSURE: 86 MMHG

## 2020-09-04 VITALS — DIASTOLIC BLOOD PRESSURE: 95 MMHG | SYSTOLIC BLOOD PRESSURE: 158 MMHG

## 2020-09-04 VITALS — SYSTOLIC BLOOD PRESSURE: 118 MMHG | DIASTOLIC BLOOD PRESSURE: 76 MMHG

## 2020-09-04 LAB
ADD MANUAL DIFF: YES
ALBUMIN SERPL-MCNC: 2.4 G/DL (ref 3.4–5)
ALBUMIN/GLOB SERPL: 0.5 {RATIO} (ref 1–2.7)
ALP SERPL-CCNC: 49 U/L (ref 46–116)
ALT SERPL-CCNC: 31 U/L (ref 12–78)
ANION GAP SERPL CALC-SCNC: 14 MMOL/L (ref 5–15)
APPEARANCE UR: (no result)
APTT PPP: YELLOW S
AST SERPL-CCNC: 36 U/L (ref 15–37)
BILIRUB SERPL-MCNC: 0.3 MG/DL (ref 0.2–1)
BUN SERPL-MCNC: 12 MG/DL (ref 7–18)
CALCIUM SERPL-MCNC: 8.7 MG/DL (ref 8.5–10.1)
CHLORIDE SERPL-SCNC: 106 MMOL/L (ref 98–107)
CO2 SERPL-SCNC: 20 MMOL/L (ref 21–32)
CREAT SERPL-MCNC: 1 MG/DL (ref 0.55–1.3)
ERYTHROCYTE [DISTWIDTH] IN BLOOD BY AUTOMATED COUNT: 14.3 % (ref 11.6–14.8)
GLOBULIN SER-MCNC: 4.7 G/DL
GLUCOSE UR STRIP-MCNC: NEGATIVE MG/DL
HCT VFR BLD CALC: 35.2 % (ref 42–52)
HGB BLD-MCNC: 11.7 G/DL (ref 14.2–18)
KETONES UR QL STRIP: NEGATIVE
LEUKOCYTE ESTERASE UR QL STRIP: (no result)
MCV RBC AUTO: 94 FL (ref 80–99)
NITRITE UR QL STRIP: NEGATIVE
PH UR STRIP: 6 [PH] (ref 4.5–8)
PLATELET # BLD: 254 K/UL (ref 150–450)
POTASSIUM SERPL-SCNC: 3.4 MMOL/L (ref 3.5–5.1)
PROT UR QL STRIP: (no result)
RBC # BLD AUTO: 3.75 M/UL (ref 4.7–6.1)
SODIUM SERPL-SCNC: 140 MMOL/L (ref 136–145)
SP GR UR STRIP: 1.01 (ref 1–1.03)
UROBILINOGEN UR-MCNC: NORMAL MG/DL (ref 0–1)
WBC # BLD AUTO: 25.8 K/UL (ref 4.8–10.8)

## 2020-09-04 RX ADMIN — SODIUM CHLORIDE SCH MLS/HR: 0.9 INJECTION INTRAVENOUS at 21:05

## 2020-09-04 RX ADMIN — Medication SCH MLS/HR: at 23:00

## 2020-09-04 RX ADMIN — HEPARIN SODIUM SCH UNITS: 5000 INJECTION INTRAVENOUS; SUBCUTANEOUS at 21:07

## 2020-09-04 NOTE — CONSULTATION
History of Present Illness


General


Date patient seen:  Sep 4, 2020


Chief Complaint:  Alcohol Intoxication





Present Illness


HPI





56 y/o M with hx of HTN, homelessness, ETOH abuse is brought to ED by EMS on 9/

3 after being found on the streets with unsteady gait, tremors. Patient was 

just discharged for [facility] 1 day prior to admission for same 

reason. Upon admission, patient ahd T of 104.3


Allergies:  


Coded Allergies:  


     No Known Allergies (Unverified , 11/18/15)





Medication History


Scheduled


Amlodipine Besylate* (Amlodipine Besylate*), 10 MG ORAL DAILY, (Reported)


Chlorthalidone* (Chlorthalidone*), 25 MG ORAL DAILY, (Reported)


Folic Acid* (Folic Acid*), 1 MG ORAL DAILY, (Reported)


Labetalol HCl (Labetalol HCl), 400 MG ORAL TID, (Reported)


Methocarbamol* (Robaxin-750*), 750 MG PO QID


Naloxone HCl (Narcan), 4 MG NS ONCE


Naproxen* (Naproxen*), 500 MG ORAL TWICE A DAY


Thiamine Hcl* (Vitamin B-1*), 100 MG ORAL DAILY, (Reported)





Scheduled PRN


Hydrocodone Bit/Acetaminophen 5-325* (Norco 5-325 Tablet*), 1 TAB ORAL Q6H PRN 

for FOR PAIN


Hydrocodone Bit/Acetaminophen 5-325* (Norco 5-325 Tablet*), 1 TAB ORAL Q6H PRN 

for FOR PAIN





Miscellaneous Medications


Epinephrine (Epinephrine), 0.3 MG IM, (Reported)


Multivitamin (Multi-Vitamin Daily), 1 EACH PO, (Reported)


Phenol (Cepastat), 14.5 MG MM, (Reported)





Durable Medical Equipment


Back Brace (Back Stabilizer), EACH , (DME)





Patient History


Healthcare decision maker





Resuscitation status





Advanced Directive on File





Patient History Narrative





Pmhx: as above





Shx: reviewed





Fhx: non contributory





Review of Systems


All Other Systems:  negative except mentioned in HPI





Physical Exam


Physical Exam Narrative


General Appearance:  WD/WN


Lines, tubes and drains:  peripheral


HEENT:  normocephalic, anicteric


Neck:  non-tender, supple


Respiratory/Chest:  chest wall non-tender, normal breath sounds


Breasts:  no masses


Abdomen:  normal bowel sounds, soft


Extremities:  normal range of motion





Last 24 Hour Vital Signs








  Date Time  Temp Pulse Resp B/P (MAP) Pulse Ox O2 Delivery O2 Flow Rate FiO2


 


9/4/20 09:00  95 19 122/77 (92) 100   


 


9/4/20 08:00 99.0 96 19 129/80 (96) 100   


 


9/4/20 08:00      Nasal Cannula 3.0 


 


9/4/20 07:00  95 20 129/83 (98) 100   


 


9/4/20 06:00  100 20 125/79 (94) 100   


 


9/4/20 05:00  102 21 112/70 (84) 100   


 


9/4/20 04:00 98.8 103 22 125/81 (96) 100   


 


9/4/20 04:00      Nasal Cannula 3.0 


 


9/4/20 03:11  103      


 


9/4/20 03:00  101 21 115/72 (86) 100   


 


9/4/20 02:00  107 19 118/76 (90) 100   


 


9/4/20 01:00  108 18 109/73 (85) 100   


 


9/4/20 00:31      Nasal Cannula 3.0 


 


9/4/20 00:30  114 19 133/82 (99) 97   


 


9/4/20 00:29       3.0 


 


9/4/20 00:10 100.1 110 20 158/95 (116) 100   


 


9/4/20 00:07  110      


 


9/3/20 23:55 101.0 117 21 122/78 100 Nasal Cannula 2.0 


 


9/3/20 23:30   21   Nasal Cannula 2.0 


 


9/3/20 23:15   16   Nasal Cannula 2.0 


 


9/3/20 23:00   16   Nasal Cannula 2.0 


 


9/3/20 22:45   18   Nasal Cannula 2.0 


 


9/3/20 21:40  120 18 163/96 100   


 


9/3/20 21:10  120 18 163/96 100   


 


9/3/20 20:20 101.0 120 18 163/96 100 Room Air  


 


9/3/20 18:00  120 16     


 


9/3/20 18:00 104.3 120 16 175/95 98 Room Air  

















Intake and Output  


 


 9/3/20 9/4/20





 19:00 07:00


 


Intake Total  550 ml


 


Output Total  400 ml


 


Balance  150 ml


 


  


 


Intake IV Total  550 ml


 


Output Urine Total  400 ml


 


# Voids  7


 


# Bowel Movements  1











Laboratory Tests








Test


  9/3/20


18:10 9/3/20


18:40 9/3/20


20:17 9/4/20


03:50


 


Serum Alcohol < 3 mg/dL     


 


White Blood Count


  


  17.7 K/UL


(4.8-10.8)  H 


  25.8 K/UL


(4.8-10.8)  *H


 


Red Blood Count


  


  3.78 M/UL


(4.70-6.10)  L 


  3.75 M/UL


(4.70-6.10)  L


 


Hemoglobin


  


  12.1 G/DL


(14.2-18.0)  L 


  11.7 G/DL


(14.2-18.0)  L


 


Hematocrit


  


  36.1 %


(42.0-52.0)  L 


  35.2 %


(42.0-52.0)  L


 


Mean Corpuscular Volume  95 FL (80-99)    94 FL (80-99)  


 


Mean Corpuscular Hemoglobin


  


  32.0 PG


(27.0-31.0)  H 


  31.3 PG


(27.0-31.0)  H


 


Mean Corpuscular Hemoglobin


Concent 


  33.5 G/DL


(32.0-36.0) 


  33.4 G/DL


(32.0-36.0)


 


Red Cell Distribution Width


  


  14.6 %


(11.6-14.8) 


  14.3 %


(11.6-14.8)


 


Platelet Count


  


  258 K/UL


(150-450) 


  254 K/UL


(150-450)


 


Mean Platelet Volume


  


  7.8 FL


(6.5-10.1) 


  7.7 FL


(6.5-10.1)


 


Neutrophils (%) (Auto)


  


  82.6 %


(45.0-75.0)  H 


  % (45.0-75.0)


 


 


Lymphocytes (%) (Auto)


  


  4.1 %


(20.0-45.0)  L 


  % (20.0-45.0)


 


 


Monocytes (%) (Auto)


  


  11.6 %


(1.0-10.0)  H 


   % (1.0-10.0)  


 


 


Eosinophils (%) (Auto)


  


  0.2 %


(0.0-3.0) 


   % (0.0-3.0)  


 


 


Basophils (%) (Auto)


  


  1.4 %


(0.0-2.0) 


   % (0.0-2.0)  


 


 


Sodium Level


  


  137 MMOL/L


(136-145) 


  140 MMOL/L


(136-145)


 


Potassium Level


  


  3.7 MMOL/L


(3.5-5.1) 


  3.4 MMOL/L


(3.5-5.1)  L


 


Chloride Level


  


  103 MMOL/L


() 


  106 MMOL/L


()


 


Carbon Dioxide Level


  


  22 MMOL/L


(21-32) 


  20 MMOL/L


(21-32)  L


 


Anion Gap


  


  12 mmol/L


(5-15) 


  14 mmol/L


(5-15)


 


Blood Urea Nitrogen


  


  17 mg/dL


(7-18) 


  12 mg/dL


(7-18)


 


Creatinine


  


  1.2 MG/DL


(0.55-1.30) 


  1.0 MG/DL


(0.55-1.30)


 


Estimat Glomerular Filtration


Rate 


  > 60 mL/min


(>60) 


  > 60 mL/min


(>60)


 


Glucose Level


  


  114 MG/DL


()  H 


  113 MG/DL


()  H


 


Lactic Acid Level


  


  0.50 mmol/L


(0.4-2.0) 


  


 


 


Calcium Level


  


  10.2 MG/DL


(8.5-10.1)  H 


  8.7 MG/DL


(8.5-10.1)


 


Total Bilirubin


  


  0.2 MG/DL


(0.2-1.0) 


  0.3 MG/DL


(0.2-1.0)


 


Aspartate Amino Transf


(AST/SGOT) 


  46 U/L (15-37)


H 


  36 U/L (15-37)


 


 


Alanine Aminotransferase


(ALT/SGPT) 


  40 U/L (12-78)


  


  31 U/L (12-78)


 


 


Alkaline Phosphatase


  


  57 U/L


() 


  49 U/L


()


 


Troponin I


  


  0.000 ng/mL


(0.000-0.056) 


  


 


 


Total Protein


  


  8.4 G/DL


(6.4-8.2)  H 


  7.1 G/DL


(6.4-8.2)


 


Albumin


  


  3.0 G/DL


(3.4-5.0)  L 


  2.4 G/DL


(3.4-5.0)  L


 


Globulin  5.4 g/dL    4.7 g/dL  


 


Albumin/Globulin Ratio


  


  0.6 (1.0-2.7)


L 


  0.5 (1.0-2.7)


L


 


Urine Color   Pale yellow   


 


Urine Appearance   Clear   


 


Urine pH   5.0 (4.5-8.0)   


 


Urine Specific Gravity


  


  


  1.010


(1.005-1.035) 


 


 


Urine Protein


  


  


  1+ (NEGATIVE)


H 


 


 


Urine Glucose (UA)


  


  


  Negative


(NEGATIVE) 


 


 


Urine Ketones


  


  


  1+ (NEGATIVE)


H 


 


 


Urine Blood


  


  


  Negative


(NEGATIVE) 


 


 


Urine Nitrite


  


  


  Negative


(NEGATIVE) 


 


 


Urine Bilirubin


  


  


  Negative


(NEGATIVE) 


 


 


Urine Urobilinogen


  


  


  Normal MG/DL


(0.0-1.0) 


 


 


Urine Leukocyte Esterase


  


  


  Negative


(NEGATIVE) 


 


 


Urine RBC


  


  


  0-2 /HPF (0 -


0)  H 


 


 


Urine WBC


  


  


  10-15 /HPF (0


- 0)  H 


 


 


Urine Squamous Epithelial


Cells 


  


  None /LPF


(NONE/OCC) 


 


 


Urine Bacteria


  


  


  Occasional


/HPF (NONE) 


 


 


Differential Total Cells


Counted 


  


  


  100  


 


 


Neutrophils % (Manual)    90 % (45-75)  H


 


Lymphocytes % (Manual)    3 % (20-45)  L


 


Monocytes % (Manual)    7 % (1-10)  


 


Eosinophils % (Manual)    0 % (0-3)  


 


Basophils % (Manual)    0 % (0-2)  


 


Band Neutrophils    0 % (0-8)  


 


Platelet Estimate    Adequate  


 


Platelet Morphology    Normal  


 


Hypochromasia    1+  


 


Anisocytosis    1+  


 


Amylase Level


  


  


  


  53 U/L


()


 


Lipase


  


  


  


  116 U/L


()











Microbiology








 Date/Time


Source Procedure


Growth Status


 


 


 9/3/20 18:40


Nasopharynx SARS-CoV-2 RdRp Gene Assay - Final Complete








Height (Feet):  5


Height (Inches):  7.00


Weight (Pounds):  160


Medications





Current Medications








 Medications


  (Trade)  Dose


 Ordered  Sig/Yeison


 Route


 PRN Reason  Start Time


 Stop Time Status Last Admin


Dose Admin


 


 Acetaminophen


  (Tylenol)  650 mg  Q4H  PRN


 ORAL


 fever  9/3/20 23:00


 10/3/20 22:59   


 


 


 Amikacin Protocol


  (Amikacin


 pharmacy to dose)  1 ea  DAILY  PRN


 MISC


 Per rx protocol  9/4/20 10:30


 10/4/20 10:29   


 


 


 Amikacin Sulfate


 1000 mg/Sodium


 Chloride  279 ml @ 


 279 mls/hr  Q24H


 IV


   9/4/20 14:00


 9/11/20 13:59  9/4/20 13:17


 


 


 Chlordiazepoxide


  (Librium)  25 mg  Q6H  PRN


 ORAL


 Agitation  9/3/20 23:00


 9/10/20 22:59   


 


 


 Chlordiazepoxide


  (Librium)  25 mg  Q6H  PRN


 ORAL


 ANXIETY  9/4/20 11:15


 9/11/20 11:14   


 


 


 Chlordiazepoxide


  (Librium)  25 mg  Q6H  PRN


 ORAL


 TACHYCARDIA  9/4/20 11:15


 9/11/20 11:14   


 


 


 Dextrose


  (Dextrose 50%)  25 ml  Q30M  PRN


 IV


 Hypoglycemia  9/3/20 23:00


 12/2/20 22:59   


 


 


 Dextrose


  (Dextrose 50%)  50 ml  Q30M  PRN


 IV


 Hypoglycemia  9/3/20 23:00


 12/2/20 22:59   


 


 


 Folic Acid 1 mg/


 Magnesium Sulfate


 2000 mg/


 Multivitamins 10


 ml/Potassium


 Chloride/Sodium


 Chloride  1,014.2 ml


  @ 124.876


 mls/hr  Q24H


 IV


   9/4/20 09:00


 10/4/20 08:59  9/4/20 08:06


 


 


 Heparin Sodium


  (Porcine)


  (Heparin 5000


 units/ml)  5,000 units  EVERY 12  HOURS


 SUBQ


   9/4/20 09:00


 10/19/20 08:59  9/4/20 08:07


 


 


 Ondansetron HCl


  (Zofran)  4 mg  Q6H  PRN


 IVP


 Nausea & Vomiting  9/3/20 23:00


 10/3/20 22:59   


 


 


 Polyethylene


 Glycol


  (Miralax)  17 gm  HSPRN  PRN


 ORAL


 Constipation  9/3/20 23:00


 10/3/20 22:59   


 


 


 Thiamine HCl 100


 mg/Dextrose  56 ml @ 


 112 mls/hr  Q24H


 IVPB


   9/4/20 09:00


 10/4/20 08:59  9/4/20 08:07


 


 


 Vancomycin HCl


  (Vanco pharmacy


 to dose)  1 ea  DAILY  PRN


 MISC


 Per rx protocol  9/4/20 10:30


 10/4/20 10:29   


 


 


 Vancomycin/Sodium


 Chloride  275 ml @ 


 183.333


 mls/hr  Q12H


 IVPB


   9/5/20 00:00


 9/10/20 00:00   


 


 


 Zolpidem Tartrate


  (Ambien)  5 mg  HSPRN  PRN


 ORAL


 Insomnia  9/3/20 23:00


 9/10/20 22:59   


 











Assessment/Plan


Assessment/Plan:


Abx:


IV Vancomycin 9/3-


IV amikacin 9/4-





Assessment:


COVID19 neg x1 (9/3 rapid PCR neg)





Sepsis vs SIRS- ?source of infection


r/o probable bacteremia, UTI, PNA





High fever


Leukocytosis, worsening


   -u/a wbc 10-15, nit neg, leuk est neg


   -CXR  report pending (per ER interpretation: no consolidation, no effusion, 

no pneumothorax, no acute cardiopulmonary disease)


   -Bcx p





Alcohol withdrawal





HTN


homelessness


ETOH abuse





Plan:


-Continue empiric IV Vancomycin #2 and switch IV Amikacin #1 to Cefepime


-f/u cx


-Monitor CBC/CMP, temperatures


-2d echo, HIV ab sc and VL, RPR


-COVID19 isolation and testing; repeat 2nd COVID PCR


-Cdiff if diarrhea





Thank  you for consulting [facility].  Will continue to follow along with 

you.





Discussed with DA.











Heidi Kitchen M.D. Sep 4, 2020 14:27

## 2020-09-04 NOTE — NUR
NURSE HAND-OFF: 



Important Events on Shift:[IV fluids, safety and comfort, wound care, turning and 
repositioning]

Patient Status: [stable]

Diet: [regular]



Pending Orders: []

Pending Results/Labs:[]

Pending MD notification:[]



Latest Vital Signs: Temperature 99.1 , Pulse 88 , B/P 148 /72 , Respiratory Rate 17 , O2 SAT 
100 , Room Air, O2 Flow Rate 3.0 .  

Vital Sign Comment: []



Latest Brennan Fall Score: 60  

Fall Risk: High Risk 

Safety Measures: Call light Within Reach, Bed Alarm Zone 2, Side Rails Side Rails x3, Bed 
position Low and Locked.

Fall Precautions: 

Yellow Socks



Report given to [DA Evans].

## 2020-09-04 NOTE — NUR
NURSE NOTES:

Pt. received from DA James. Pt. AAOx3, on room air, breathing even and unlabored, no 
indications of pain and no indications of respiratory distress.  IV noted right AC 20g 
intact and patent,  IV left UA 22g intact and patent. Bed is low and locked, side rails x2 
up, bed alarm active, and call light in reach.

## 2020-09-04 NOTE — NUR
NURSE NOTES:

Bedside assessment performed, assessed pt with a FLACC scale of 0 noted. VS obtained and 
remain stable at this time. Pt frequently assessed for seizure activity and remains free 
from s/sx of seizures at this time. Pt remains drowsy and disoriented but compliant with 
care at this time. Pt remains oriented x 1-2. Unsuccessful in attempts to reach MD regarding 
continuing Versed gtt from ED; Versed wasted with Charge RN and disposed of per protocol. Pt 
remains clean, dry and comfortable. Fall, Aspiration, Seizure and Skin precautions observed. 
Pt remains resting in bed; Bed remains in the lowest position with the safety wheels 
engaged, call light within reach, side rails up x3 and bed alarm activated. Will continue 
plan of care. Will continue to monitor.

## 2020-09-04 NOTE — NUR
NOTE



PT is currently PUI. Pt was sleeping when this SW attempted to meet w/ pt. SW will attempt 
later.

## 2020-09-04 NOTE — NUR
TRANSFER TO FLOOR:

Patient transferred to [facility]. Report given to DA James. Inventory check done. Patient is 
in stable condition. No distress/pain noted. On room air. Endorsed plan of care.

## 2020-09-04 NOTE — NUR
NURSE NOTES:

Called and left message for MD Cook at this time. Patient came up with Versed gtt running 
at 3mg/hr wanted to verify order to continue gtt. Awaiting call back.

## 2020-09-04 NOTE — DIAGNOSTIC IMAGING REPORT
Indication: Cough

 

Technique: One view of the chest

 

Comparison: none

 

Findings: Patient's chin obscures the right lung apex. Inspiration is suboptimal,

with crowding of bronchovascular markings at the lung bases. The lungs and pleural

spaces are otherwise clear. The heart size is normal

 

Impression: No acute process

## 2020-09-04 NOTE — NUR
NURSE HAND-OFF REPORT: 



Latest Vital Signs: Temperature 98.8 , Pulse 100 , B/P 125 /79 , Respiratory Rate 20 , O2 
 , Nasal Cannula, O2 Flow Rate 3.0 .  

Vital Sign Comment: VS remained stable for duration of shift  



EKG Rhythm: Sinus Rhythm

Rhythm change?: N 

MD Notified?: -

MD Response: N/A



Latest Brennan Fall Score: 60  

Fall Risk: High Risk 

Safety Measures: Call light Within Reach, Bed Alarm Zone 2, Side Rails Side Rails x3, Bed 
position Low and Locked.

Fall Precautions: 

Yellow Socks

Yellow Gown

Door Sign

Patient Fall Education



Report given to DA Bertrand. Endorsed plan of care.

## 2020-09-04 NOTE — NUR
NURSE NOTES:

Pt transported from ED to ICU via gurney without incident. Belongings checked at bedside and 
accounted for. ED nurse to record and send pt medications to pharmacy. Received patient and 
report from DA Paulino. Patient is observed resting in bed and noted to be lethargic but 
responsive to stimuli; opens eyes spontaneously/PERRLA, pt arousable to name and shaking, 
and able to follow simple commands. Pt however, noted to be alerted and confused. Pt able to 
make needs known at this time. Pt currently denies pain upon assessment. Pt is on 3L NC with 
an O2 saturation of 100% noted at this time. Bilateral lower lobe breath sounds noted to be 
diminished upon auscultation. Pt noted to be ST on tele monitor with a HR of 114 with no 
s/sx of acute cardiac distress noted. R AC 20g and L upper arm 22g IV catheters noted which 
remain asymptomatic, intact and patent. Active bowel sounds noted in all four quadrants, 
abdomen remains soft, round and nontender. Diagnostics reviewed at bedside. Pt immediately 
provided with bed bath, oral care and linen change, noted to be soiled. Condom catheter 
applied and skin alterations noted. Wound care performed. Pt tolerated care well. Pt 
repositioned for comfort and safety. Fall, Aspiration, Seizure and Skin precautions 
observed. Pt remains resting in bed; Bed remains in the lowest position with the safety 
wheels engaged, call light within reach, side rails up x3 and bed alarm activated. Will 
continue plan of care. Will continue to monitor.

## 2020-09-04 NOTE — NUR
NURSE NOTES:

Bedside assessment performed, assessed pt with a FLACC scale of 0 noted. VS obtained and 
remain stable at this time. Pt frequently assessed for seizure activity and remains free 
from s/sx of seizures at this time. Pt remains drowsy and disoriented but compliant with 
care at this time. Pt remains clean, dry and comfortable. Fall, Aspiration, Seizure and Skin 
precautions observed. Pt remains resting in bed; Bed remains in the lowest position with the 
safety wheels engaged, call light within reach, side rails up x3 and bed alarm activated. 
Will continue plan of care. Will continue to monitor.

## 2020-09-04 NOTE — NUR
ADDENDUM TO ABOVE WOUND CARE NOTES:Pt also noted to have # 3 abrasions L Knee. Most medial 
abrasion is dry and scabbed . Abrasion jairon L Knee pink , with scattered Biofilm . No 
exudate noted. Abrasion Lateral Aspect of L knee dry and scabbed .Historical scar L Knee 
cap.



Tx.Plan: Cleanse with Saline. Cover with Optifoam drsg. Change every 3 days and prn.

## 2020-09-04 NOTE — NUR
NURSE NOTES:

Patient received from ICU at 1630 from DA Bertrand. Patient awake in bed, alert and oriented x 
3-4, verbally responsive, no SOB, bed in lowest position with breaks engaged and alarm on, 
denies any pain at this time, on room air, VS WNL, condom cath present, belongings checked 
and complete, wound dressings intact and dry, wound pictures taken in ICU and wound nurse 
aware, will continue to monitor and proceed with plan of care, IV line patent and intact, 
call light within reach.

## 2020-09-04 NOTE — NUR
NOTE



SW met w/ pt and discussed homelessness. Pt has been homeless for a year. Pt receives GR and 
food stamp. Pt has 3 estranged children and they are not in contact. Pt did not provide an 
emergency contact. Pt shares he received physical therapy at [facility] for 5 days. Pt 
concerns that he has trouble with his walking and he does not use any DME. PT to evaluate 
pt's mobility. 



Pt denies abusing substance/tobacco. Pt reports drinking 3 24oz cans 3/week. Pt declined 
counseling/tx intervention/resource on substance abuse. 



Pt is currently PUI. Awaiting for the second covid result. Pt plans to return to [facility] 
and [facility]. Pt stated that the neighborhood is very supportive and that he would 
like to return there upon DC via public bus. SW encouraged pt to consider licensed 
facilities or placement assistance offered by this SW but pt refused. SW explained negative 
ramification of unlicensed facilities/self-directing. Pt verbalized understanding. PT also 
verbalized he is able to navigate homeless resource in the area and he declined to receive 
the community resource packet. Pt requested a shirt and pants. SW provided such clothing. Pt 
did not share further concern/needs at this time. 








-------------------------------------------------------------------------------

Addendum: 09/04/20 at 1701 by RAFIQ HINOJOSA

-------------------------------------------------------------------------------

Possible DC placement option:

[facility] housing if pt is covid +ve.



Pt declined the project [facility] referral.

## 2020-09-04 NOTE — NUR
NURSE NOTES:WOUND CARE NOTES:Pt presented on admission with multiple Pressure 
Injuries.Partially opened DTPI R Hip(L)8.8cm x (W)9.3cm. Base of Pressure injury is 75% 
reabsorbing and maroon in colour,25% open wound that is 50% dry eschar ,50% pink 
epithelial.Non-Blanchable erythema along borders.

Partially opened DTPI Sacrum,R and L Buttocks(L)9cm  x (W)10.5cm.Partially opened wound with 
soft loose necrotic cap R coccygeal(L)1.5cm x (W)1cm.

Partial thickness Pressure injury R Buttocks(L)4cm x (W)4.8cm. Base of wound is moist and 
rich with surrounding non-blanching erythema periwound. 

Reabsorbing DTPI L trochanteric(L)8cm x (W)9.5cm. Base of wound is maroon without induration 
or fluctuance..Both heels are boggy with non-blanchable erythema.



Tx.Plan: Apply Moisture Barrier Paste to Sacrum. Cover with Optifoam drsgs. Change every 3 
days and prn.

            Apply Moisture Barrier Paste to R hip. Cover with Optifoam drsg. Change every 3 
days and prn.

            Apply Cavilon Skin Barrier to L trochanteric. Cover with Optifoam drsg. Change 
every 3 days and prn.

            Apply Cavilon Skin Barrier to R and L Heels. Cover each heel with Optifoam 
drsgs. Change every 7 days and prn.

            Reposition at least every 2hours or as tolerated.

            Off-load heels with Pillow.

## 2020-09-04 NOTE — NUR
NURSE NOTES:

Bedside assessment performed, assessed pt with a FLACC scale of 0 noted. VS obtained and 
remain stable at this time. Pt frequently assessed for seizure activity and remains free 
from s/sx of seizures at this time. Pt remains drowsy and disoriented but compliant with 
care at this time. Pt oriented x 1-2. Pt remains clean, dry and comfortable. Fall, 
Aspiration, Seizure and Skin precautions observed. Pt remains resting in bed; Bed remains in 
the lowest position with the safety wheels engaged, call light within reach, side rails up 
x3 and bed alarm activated. Will continue plan of care. Will continue to monitor.

## 2020-09-04 NOTE — CONSULTATION
History of Present Illness


General


Date patient seen:  Sep 4, 2020


Chief Complaint:  Alcohol Intoxication





Present Illness


HPI


55 year old homeless male was brought in  from street c/o alcohol withdrawal- 

tremors, unsteady gait. Pt was discharged  from [facility] yesterday 

for the same sx. AAOx4, verbally responsive. Temp 104.3F,


Allergies:  


Coded Allergies:  


     No Known Allergies (Unverified , 11/18/15)





Medication History


Scheduled


Amlodipine Besylate* (Amlodipine Besylate*), 10 MG ORAL DAILY, (Reported)


Chlorthalidone* (Chlorthalidone*), 25 MG ORAL DAILY, (Reported)


Folic Acid* (Folic Acid*), 1 MG ORAL DAILY, (Reported)


Labetalol HCl (Labetalol HCl), 400 MG ORAL TID, (Reported)


Methocarbamol* (Robaxin-750*), 750 MG PO QID


Naloxone HCl (Narcan), 4 MG NS ONCE


Naproxen* (Naproxen*), 500 MG ORAL TWICE A DAY


Thiamine Hcl* (Vitamin B-1*), 100 MG ORAL DAILY, (Reported)





Scheduled PRN


Hydrocodone Bit/Acetaminophen 5-325* (Norco 5-325 Tablet*), 1 TAB ORAL Q6H PRN 

for FOR PAIN


Hydrocodone Bit/Acetaminophen 5-325* (Norco 5-325 Tablet*), 1 TAB ORAL Q6H PRN 

for FOR PAIN





Miscellaneous Medications


Epinephrine (Epinephrine), 0.3 MG IM, (Reported)


Multivitamin (Multi-Vitamin Daily), 1 EACH PO, (Reported)


Phenol (Cepastat), 14.5 MG MM, (Reported)





Durable Medical Equipment


Back Brace (Back Stabilizer), EACH , (DME)





Patient History


Healthcare decision maker





Resuscitation status





Advanced Directive on File








Past Medical/Surgical History


Past Medical/Surgical History:  


(1) History of hypertension


(2) ETOH abuse


(3) Homelessness





Review of Systems


All Other Systems:  negative except mentioned in HPI





Physical Exam


General Appearance:  WD/WN


Lines, tubes and drains:  peripheral


HEENT:  normocephalic, anicteric


Neck:  non-tender, supple


Respiratory/Chest:  chest wall non-tender, normal breath sounds


Breasts:  no masses


Abdomen:  normal bowel sounds, soft


Genitourinary/Rectal:  normal genital exam, normal prostate exam


Extremities:  normal range of motion





Last 24 Hour Vital Signs








  Date Time  Temp Pulse Resp B/P (MAP) Pulse Ox O2 Delivery O2 Flow Rate FiO2


 


9/4/20 09:00  95 19 122/77 (92) 100   


 


9/4/20 08:00 99.0 96 19 129/80 (96) 100   


 


9/4/20 08:00      Nasal Cannula 3.0 


 


9/4/20 07:00  95 20 129/83 (98) 100   


 


9/4/20 06:00  100 20 125/79 (94) 100   


 


9/4/20 05:00  102 21 112/70 (84) 100   


 


9/4/20 04:00 98.8 103 22 125/81 (96) 100   


 


9/4/20 04:00      Nasal Cannula 3.0 


 


9/4/20 03:11  103      


 


9/4/20 03:00  101 21 115/72 (86) 100   


 


9/4/20 02:00  107 19 118/76 (90) 100   


 


9/4/20 01:00  108 18 109/73 (85) 100   


 


9/4/20 00:31      Nasal Cannula 3.0 


 


9/4/20 00:30  114 19 133/82 (99) 97   


 


9/4/20 00:29       3.0 


 


9/4/20 00:10 100.1 110 20 158/95 (116) 100   


 


9/4/20 00:07  110      


 


9/3/20 23:55 101.0 117 21 122/78 100 Nasal Cannula 2.0 


 


9/3/20 23:30   21   Nasal Cannula 2.0 


 


9/3/20 23:15   16   Nasal Cannula 2.0 


 


9/3/20 23:00   16   Nasal Cannula 2.0 


 


9/3/20 22:45   18   Nasal Cannula 2.0 


 


9/3/20 21:40  120 18 163/96 100   


 


9/3/20 21:10  120 18 163/96 100   


 


9/3/20 20:20 101.0 120 18 163/96 100 Room Air  


 


9/3/20 18:00  120 16     


 


9/3/20 18:00 104.3 120 16 175/95 98 Room Air  

















Intake and Output  


 


 9/3/20 9/4/20





 19:00 07:00


 


Intake Total  550 ml


 


Output Total  400 ml


 


Balance  150 ml


 


  


 


Intake IV Total  550 ml


 


Output Urine Total  400 ml


 


# Voids  7


 


# Bowel Movements  1











Laboratory Tests








Test


  9/3/20


18:10 9/3/20


18:40 9/3/20


20:17 9/4/20


03:50


 


Serum Alcohol < 3 mg/dL     


 


White Blood Count


  


  17.7 K/UL


(4.8-10.8)  H 


  25.8 K/UL


(4.8-10.8)  *H


 


Red Blood Count


  


  3.78 M/UL


(4.70-6.10)  L 


  3.75 M/UL


(4.70-6.10)  L


 


Hemoglobin


  


  12.1 G/DL


(14.2-18.0)  L 


  11.7 G/DL


(14.2-18.0)  L


 


Hematocrit


  


  36.1 %


(42.0-52.0)  L 


  35.2 %


(42.0-52.0)  L


 


Mean Corpuscular Volume  95 FL (80-99)    94 FL (80-99)  


 


Mean Corpuscular Hemoglobin


  


  32.0 PG


(27.0-31.0)  H 


  31.3 PG


(27.0-31.0)  H


 


Mean Corpuscular Hemoglobin


Concent 


  33.5 G/DL


(32.0-36.0) 


  33.4 G/DL


(32.0-36.0)


 


Red Cell Distribution Width


  


  14.6 %


(11.6-14.8) 


  14.3 %


(11.6-14.8)


 


Platelet Count


  


  258 K/UL


(150-450) 


  254 K/UL


(150-450)


 


Mean Platelet Volume


  


  7.8 FL


(6.5-10.1) 


  7.7 FL


(6.5-10.1)


 


Neutrophils (%) (Auto)


  


  82.6 %


(45.0-75.0)  H 


  % (45.0-75.0)


 


 


Lymphocytes (%) (Auto)


  


  4.1 %


(20.0-45.0)  L 


  % (20.0-45.0)


 


 


Monocytes (%) (Auto)


  


  11.6 %


(1.0-10.0)  H 


   % (1.0-10.0)  


 


 


Eosinophils (%) (Auto)


  


  0.2 %


(0.0-3.0) 


   % (0.0-3.0)  


 


 


Basophils (%) (Auto)


  


  1.4 %


(0.0-2.0) 


   % (0.0-2.0)  


 


 


Sodium Level


  


  137 MMOL/L


(136-145) 


  140 MMOL/L


(136-145)


 


Potassium Level


  


  3.7 MMOL/L


(3.5-5.1) 


  3.4 MMOL/L


(3.5-5.1)  L


 


Chloride Level


  


  103 MMOL/L


() 


  106 MMOL/L


()


 


Carbon Dioxide Level


  


  22 MMOL/L


(21-32) 


  20 MMOL/L


(21-32)  L


 


Anion Gap


  


  12 mmol/L


(5-15) 


  14 mmol/L


(5-15)


 


Blood Urea Nitrogen


  


  17 mg/dL


(7-18) 


  12 mg/dL


(7-18)


 


Creatinine


  


  1.2 MG/DL


(0.55-1.30) 


  1.0 MG/DL


(0.55-1.30)


 


Estimat Glomerular Filtration


Rate 


  > 60 mL/min


(>60) 


  > 60 mL/min


(>60)


 


Glucose Level


  


  114 MG/DL


()  H 


  113 MG/DL


()  H


 


Lactic Acid Level


  


  0.50 mmol/L


(0.4-2.0) 


  


 


 


Calcium Level


  


  10.2 MG/DL


(8.5-10.1)  H 


  8.7 MG/DL


(8.5-10.1)


 


Total Bilirubin


  


  0.2 MG/DL


(0.2-1.0) 


  0.3 MG/DL


(0.2-1.0)


 


Aspartate Amino Transf


(AST/SGOT) 


  46 U/L (15-37)


H 


  36 U/L (15-37)


 


 


Alanine Aminotransferase


(ALT/SGPT) 


  40 U/L (12-78)


  


  31 U/L (12-78)


 


 


Alkaline Phosphatase


  


  57 U/L


() 


  49 U/L


()


 


Troponin I


  


  0.000 ng/mL


(0.000-0.056) 


  


 


 


Total Protein


  


  8.4 G/DL


(6.4-8.2)  H 


  7.1 G/DL


(6.4-8.2)


 


Albumin


  


  3.0 G/DL


(3.4-5.0)  L 


  2.4 G/DL


(3.4-5.0)  L


 


Globulin  5.4 g/dL    4.7 g/dL  


 


Albumin/Globulin Ratio


  


  0.6 (1.0-2.7)


L 


  0.5 (1.0-2.7)


L


 


Urine Color   Pale yellow   


 


Urine Appearance   Clear   


 


Urine pH   5.0 (4.5-8.0)   


 


Urine Specific Gravity


  


  


  1.010


(1.005-1.035) 


 


 


Urine Protein


  


  


  1+ (NEGATIVE)


H 


 


 


Urine Glucose (UA)


  


  


  Negative


(NEGATIVE) 


 


 


Urine Ketones


  


  


  1+ (NEGATIVE)


H 


 


 


Urine Blood


  


  


  Negative


(NEGATIVE) 


 


 


Urine Nitrite


  


  


  Negative


(NEGATIVE) 


 


 


Urine Bilirubin


  


  


  Negative


(NEGATIVE) 


 


 


Urine Urobilinogen


  


  


  Normal MG/DL


(0.0-1.0) 


 


 


Urine Leukocyte Esterase


  


  


  Negative


(NEGATIVE) 


 


 


Urine RBC


  


  


  0-2 /HPF (0 -


0)  H 


 


 


Urine WBC


  


  


  10-15 /HPF (0


- 0)  H 


 


 


Urine Squamous Epithelial


Cells 


  


  None /LPF


(NONE/OCC) 


 


 


Urine Bacteria


  


  


  Occasional


/HPF (NONE) 


 


 


Differential Total Cells


Counted 


  


  


  100  


 


 


Neutrophils % (Manual)    90 % (45-75)  H


 


Lymphocytes % (Manual)    3 % (20-45)  L


 


Monocytes % (Manual)    7 % (1-10)  


 


Eosinophils % (Manual)    0 % (0-3)  


 


Basophils % (Manual)    0 % (0-2)  


 


Band Neutrophils    0 % (0-8)  


 


Platelet Estimate    Adequate  


 


Platelet Morphology    Normal  


 


Hypochromasia    1+  


 


Anisocytosis    1+  


 


Amylase Level    Pending  


 


Lipase    Pending  











Microbiology








 Date/Time


Source Procedure


Growth Status


 


 


 9/3/20 18:40


Nasopharynx SARS-CoV-2 RdRp Gene Assay - Final Complete








Height (Feet):  5


Height (Inches):  7.00


Weight (Pounds):  160


Medications





Current Medications








 Medications


  (Trade)  Dose


 Ordered  Sig/Yeison


 Route


 PRN Reason  Start Time


 Stop Time Status Last Admin


Dose Admin


 


 Acetaminophen


  (Tylenol)  650 mg  Q4H  PRN


 ORAL


 fever  9/3/20 23:00


 10/3/20 22:59   


 


 


 Amikacin Protocol


  (Amikacin


 pharmacy to dose)  1 ea  DAILY  PRN


 MISC


 Per rx protocol  9/4/20 10:30


 10/4/20 10:29 UNV  


 


 


 Chlordiazepoxide


  (Librium)  25 mg  Q6H  PRN


 ORAL


 Agitation  9/3/20 23:00


 9/10/20 22:59   


 


 


 Dextrose


  (Dextrose 50%)  25 ml  Q30M  PRN


 IV


 Hypoglycemia  9/3/20 23:00


 12/2/20 22:59   


 


 


 Dextrose


  (Dextrose 50%)  50 ml  Q30M  PRN


 IV


 Hypoglycemia  9/3/20 23:00


 12/2/20 22:59   


 


 


 Folic Acid 1 mg/


 Magnesium Sulfate


 2000 mg/


 Multivitamins 10


 ml/Potassium


 Chloride/Sodium


 Chloride  1,014.2 ml


  @ 124.876


 mls/hr  Q24H


 IV


   9/4/20 09:00


 10/4/20 08:59  9/4/20 08:06


 


 


 Heparin Sodium


  (Porcine)


  (Heparin 5000


 units/ml)  5,000 units  EVERY 12  HOURS


 SUBQ


   9/4/20 09:00


 10/19/20 08:59  9/4/20 08:07


 


 


 Midazolam HCl 50


 mg/Sodium Chloride  100 ml @ 0


 mls/hr  Q24H  PRN


 IV


 Agitation  9/3/20 22:30


 9/5/20 22:18  9/3/20 22:45


 


 


 Ondansetron HCl


  (Zofran)  4 mg  Q6H  PRN


 IVP


 Nausea & Vomiting  9/3/20 23:00


 10/3/20 22:59   


 


 


 Polyethylene


 Glycol


  (Miralax)  17 gm  HSPRN  PRN


 ORAL


 Constipation  9/3/20 23:00


 10/3/20 22:59   


 


 


 Thiamine HCl 100


 mg/Dextrose  56 ml @ 


 112 mls/hr  Q24H


 IVPB


   9/4/20 09:00


 10/4/20 08:59  9/4/20 08:07


 


 


 Vancomycin HCl


  ([facility] pharmacy


 to dose)  1 ea  DAILY  PRN


 MISC


 Per rx protocol  9/4/20 10:30


 10/4/20 10:29 UNV  


 


 


 Zolpidem Tartrate


  (Ambien)  5 mg  HSPRN  PRN


 ORAL


 Insomnia  9/3/20 23:00


 9/10/20 22:59   


 











Assessment/Plan


Problem List:  


(1) Sepsis


ICD Codes:  A41.9 - Sepsis, unspecified organism


SNOMED:  79564605


Qualifiers:  


   Qualified Codes:  A41.9 - Sepsis, unspecified organism


(2) Alcohol withdrawal


ICD Codes:  F10.239 - Alcohol dependence with withdrawal, unspecified


SNOMED:  969911810


Qualifiers:  


   Qualified Codes:  F10.230 - Alcohol dependence with withdrawal, uncomplicated


(3) History of hypertension


ICD Codes:  Z86.79 - Personal history of other diseases of the circulatory 

system


SNOMED:  512635849


(4) ETOH abuse


ICD Codes:  F10.10 - Alcohol abuse, uncomplicated


SNOMED:  14401078


(5) Homelessness


ICD Codes:  Z59.0 - Homelessness


SNOMED:  73418079


Assessment/Plan:


pan culture


iv abx


Banana bag


Librium


seizure precaution











Jessy Cook MD Sep 4, 2020 11:01

## 2020-09-04 NOTE — NUR
NURSE NOTES:

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

Pt noted to have fever upon arrival to unit, isolation precautions observed pending results 
and ID consult. 

Will continue to monitor. Will carry out plan of care.

## 2020-09-05 VITALS — DIASTOLIC BLOOD PRESSURE: 86 MMHG | SYSTOLIC BLOOD PRESSURE: 140 MMHG

## 2020-09-05 VITALS — DIASTOLIC BLOOD PRESSURE: 82 MMHG | SYSTOLIC BLOOD PRESSURE: 138 MMHG

## 2020-09-05 VITALS — SYSTOLIC BLOOD PRESSURE: 140 MMHG | DIASTOLIC BLOOD PRESSURE: 93 MMHG

## 2020-09-05 VITALS — SYSTOLIC BLOOD PRESSURE: 161 MMHG | DIASTOLIC BLOOD PRESSURE: 99 MMHG

## 2020-09-05 VITALS — SYSTOLIC BLOOD PRESSURE: 156 MMHG | DIASTOLIC BLOOD PRESSURE: 99 MMHG

## 2020-09-05 LAB
ADD MANUAL DIFF: NO
ALBUMIN SERPL-MCNC: 2.1 G/DL (ref 3.4–5)
ALBUMIN/GLOB SERPL: 0.4 {RATIO} (ref 1–2.7)
ALP SERPL-CCNC: 53 U/L (ref 46–116)
ALT SERPL-CCNC: 27 U/L (ref 12–78)
AMYLASE SERPL-CCNC: 72 U/L (ref 25–115)
ANION GAP SERPL CALC-SCNC: 9 MMOL/L (ref 5–15)
AST SERPL-CCNC: 32 U/L (ref 15–37)
BASOPHILS NFR BLD AUTO: 0.4 % (ref 0–2)
BILIRUB SERPL-MCNC: 0.2 MG/DL (ref 0.2–1)
BUN SERPL-MCNC: 12 MG/DL (ref 7–18)
CALCIUM SERPL-MCNC: 8.5 MG/DL (ref 8.5–10.1)
CHLORIDE SERPL-SCNC: 108 MMOL/L (ref 98–107)
CO2 SERPL-SCNC: 22 MMOL/L (ref 21–32)
CREAT SERPL-MCNC: 1.1 MG/DL (ref 0.55–1.3)
EOSINOPHIL NFR BLD AUTO: 0.5 % (ref 0–3)
ERYTHROCYTE [DISTWIDTH] IN BLOOD BY AUTOMATED COUNT: 14.6 % (ref 11.6–14.8)
GLOBULIN SER-MCNC: 4.9 G/DL
HCT VFR BLD CALC: 33.7 % (ref 42–52)
HGB BLD-MCNC: 11.2 G/DL (ref 14.2–18)
LYMPHOCYTES NFR BLD AUTO: 8.7 % (ref 20–45)
MCV RBC AUTO: 93 FL (ref 80–99)
MONOCYTES NFR BLD AUTO: 5.7 % (ref 1–10)
NEUTROPHILS NFR BLD AUTO: 84.6 % (ref 45–75)
PHOSPHATE SERPL-MCNC: 4.2 MG/DL (ref 2.5–4.9)
PLATELET # BLD: 260 K/UL (ref 150–450)
POTASSIUM SERPL-SCNC: 3.6 MMOL/L (ref 3.5–5.1)
RBC # BLD AUTO: 3.63 M/UL (ref 4.7–6.1)
SODIUM SERPL-SCNC: 139 MMOL/L (ref 136–145)
WBC # BLD AUTO: 15.9 K/UL (ref 4.8–10.8)

## 2020-09-05 RX ADMIN — HEPARIN SODIUM SCH UNITS: 5000 INJECTION INTRAVENOUS; SUBCUTANEOUS at 20:51

## 2020-09-05 RX ADMIN — SODIUM CHLORIDE SCH MLS/HR: 0.9 INJECTION INTRAVENOUS at 05:05

## 2020-09-05 RX ADMIN — SODIUM CHLORIDE SCH MLS/HR: 0.9 INJECTION INTRAVENOUS at 20:51

## 2020-09-05 RX ADMIN — Medication SCH MLS/HR: at 12:27

## 2020-09-05 RX ADMIN — FOLIC ACID SCH MLS/HR: 5 INJECTION, SOLUTION INTRAMUSCULAR; INTRAVENOUS; SUBCUTANEOUS at 08:57

## 2020-09-05 RX ADMIN — SODIUM CHLORIDE AND POTASSIUM CHLORIDE SCH MLS/HR: 9; 1.49 INJECTION, SOLUTION INTRAVENOUS at 08:57

## 2020-09-05 RX ADMIN — SODIUM CHLORIDE SCH MLS/HR: 0.9 INJECTION INTRAVENOUS at 14:10

## 2020-09-05 RX ADMIN — ZOLPIDEM TARTRATE PRN MG: 5 TABLET ORAL at 20:51

## 2020-09-05 RX ADMIN — HEPARIN SODIUM SCH UNITS: 5000 INJECTION INTRAVENOUS; SUBCUTANEOUS at 08:58

## 2020-09-05 NOTE — NUR
NURSE NOTES:

Pt is in bed, awake and verbal. No acute distress noted. Room air.No SOB. No cough. DR. Marrufo came to see patient. COVID-19 result came out negative. DR. Marrufo discontinued 
isolation. Banana bag currently infusing via right AC. Pt is calm in bed. Bed locked low in 
position,side rails up and call light within reach. Pt will be monitored.

## 2020-09-05 NOTE — INTERNAL MED PROGRESS NOTE
Subjective


Physician Name


Shakeel Marrufo


Attending Physician


Shakeel Marrufo MD





Current Medications








 Medications


  (Trade)  Dose


 Ordered  Sig/Yeison


 Route


 PRN Reason  Start Time


 Stop Time Status Last Admin


Dose Admin


 


 Acetaminophen


  (Tylenol)  650 mg  Q4H  PRN


 ORAL


 fever  9/4/20 16:51


 10/4/20 16:50   


 


 


 Amlodipine


 Besylate


  (Norvasc)  10 mg  DAILY


 ORAL


   9/5/20 12:45


 10/5/20 12:44  9/5/20 14:10


 


 


 Cefepime HCl 1 gm/


 Dextrose  55 ml @ 


 110 mls/hr  Q8HR


 IVPB


   9/4/20 22:00


 9/11/20 14:26  9/5/20 20:51


 


 


 Chlordiazepoxide


  (Librium)  25 mg  Q6H  PRN


 ORAL


 ANXIETY  9/4/20 16:51


 9/11/20 16:50   


 


 


 Chlordiazepoxide


  (Librium)  25 mg  Q6H  PRN


 ORAL


 TACHYCARDIA  9/4/20 16:51


 9/11/20 16:50   


 


 


 Chlordiazepoxide


  (Librium)  25 mg  Q6H  PRN


 ORAL


 Agitation  9/4/20 16:52


 9/11/20 16:51   


 


 


 Dextrose


  (Dextrose 50%)  25 ml  Q30M  PRN


 IV


 Hypoglycemia  9/4/20 16:52


 12/3/20 16:51   


 


 


 Dextrose


  (Dextrose 50%)  50 ml  Q30M  PRN


 IV


 Hypoglycemia  9/4/20 16:52


 12/3/20 16:51   


 


 


 Folic Acid 1 mg/


 Magnesium Sulfate


 2000 mg/


 Multivitamins 10


 ml/Potassium


 Chloride/Sodium


 Chloride  1,014.2 ml


  @ 124.876


 mls/hr  Q24H


 IV


   9/5/20 09:00


 10/4/20 08:59  9/5/20 08:57


 


 


 Heparin Sodium


  (Porcine)


  (Heparin 5000


 units/ml)  5,000 units  EVERY 12  HOURS


 SUBQ


   9/4/20 21:00


 10/19/20 08:59  9/5/20 20:51


 


 


 Labetalol HCl


  (Normodyne)  400 mg  TID


 ORAL


   9/5/20 13:00


 10/5/20 12:59  9/5/20 17:24


 


 


 Ondansetron HCl


  (Zofran)  4 mg  Q6H  PRN


 IVP


 Nausea & Vomiting  9/4/20 16:52


 10/4/20 16:51   


 


 


 Polyethylene


 Glycol


  (Miralax)  17 gm  HSPRN  PRN


 ORAL


 Constipation  9/4/20 16:52


 10/4/20 16:51   


 


 


 Thiamine HCl 100


 mg/Dextrose  56 ml @ 


 112 mls/hr  Q24H


 IVPB


   9/5/20 09:00


 10/4/20 08:59  9/5/20 08:57


 


 


 Vancomycin HCl


  ([facility] pharmacy


 to dose)  1 ea  DAILY  PRN


 MISC


 Per rx protocol  9/5/20 09:00


 10/4/20 10:29   


 


 


 Vancomycin/Sodium


 Chloride  275 ml @ 


 183.333


 mls/hr  Q12H


 IVPB


   9/5/20 00:00


 9/10/20 00:00  9/5/20 12:27


 


 


 Zolpidem Tartrate


  (Ambien)  5 mg  HSPRN  PRN


 ORAL


 Insomnia  9/4/20 16:52


 9/11/20 16:51  9/5/20 20:51


 








Allergies:  


Coded Allergies:  


     No Known Allergies (Unverified , 11/18/15)


Subjective


awake, alert, responsive, No CP or SOB, WBC: 15.9, Urine: GPC





Objective





Last Vital Signs








  Date Time  Temp Pulse Resp B/P (MAP) Pulse Ox O2 Delivery O2 Flow Rate FiO2


 


9/5/20 21:00      Room Air  


 


9/5/20 20:00 97.9 91 18 138/82 (100) 96   


 


9/4/20 12:00       3.0 











Laboratory Tests








Test


  9/5/20


05:50


 


White Blood Count


  15.9 K/UL


(4.8-10.8)  H


 


Red Blood Count


  3.63 M/UL


(4.70-6.10)  L


 


Hemoglobin


  11.2 G/DL


(14.2-18.0)  L


 


Hematocrit


  33.7 %


(42.0-52.0)  L


 


Mean Corpuscular Volume 93 FL (80-99)  


 


Mean Corpuscular Hemoglobin


  30.8 PG


(27.0-31.0)


 


Mean Corpuscular Hemoglobin


Concent 33.2 G/DL


(32.0-36.0)


 


Red Cell Distribution Width


  14.6 %


(11.6-14.8)


 


Platelet Count


  260 K/UL


(150-450)


 


Mean Platelet Volume


  7.4 FL


(6.5-10.1)


 


Neutrophils (%) (Auto)


  84.6 %


(45.0-75.0)  H


 


Lymphocytes (%) (Auto)


  8.7 %


(20.0-45.0)  L


 


Monocytes (%) (Auto)


  5.7 %


(1.0-10.0)


 


Eosinophils (%) (Auto)


  0.5 %


(0.0-3.0)


 


Basophils (%) (Auto)


  0.4 %


(0.0-2.0)


 


Erythrocyte Sedimentation Rate


  71 MM/HR


(0-20)  H


 


Sodium Level


  139 MMOL/L


(136-145)


 


Potassium Level


  3.6 MMOL/L


(3.5-5.1)


 


Chloride Level


  108 MMOL/L


()  H


 


Carbon Dioxide Level


  22 MMOL/L


(21-32)


 


Anion Gap


  9 mmol/L


(5-15)


 


Blood Urea Nitrogen


  12 mg/dL


(7-18)


 


Creatinine


  1.1 MG/DL


(0.55-1.30)


 


Estimat Glomerular Filtration


Rate > 60 mL/min


(>60)


 


Glucose Level


  115 MG/DL


()  H


 


Calcium Level


  8.5 MG/DL


(8.5-10.1)


 


Phosphorus Level


  4.2 MG/DL


(2.5-4.9)


 


Magnesium Level


  1.9 MG/DL


(1.8-2.4)


 


Total Bilirubin


  0.2 MG/DL


(0.2-1.0)


 


Aspartate Amino Transf


(AST/SGOT) 32 U/L (15-37)


 


 


Alanine Aminotransferase


(ALT/SGPT) 27 U/L (12-78)


 


 


Alkaline Phosphatase


  53 U/L


()


 


C-Reactive Protein,


Quantitative 9.9 mg/dL


(0.00-0.90)  H


 


Total Protein


  7.0 G/DL


(6.4-8.2)


 


Albumin


  2.1 G/DL


(3.4-5.0)  L


 


Globulin 4.9 g/dL  


 


Albumin/Globulin Ratio


  0.4 (1.0-2.7)


L


 


Amylase Level


  72 U/L


()


 


Lipase


  146 U/L


()


 


Rapid Plasma Reagin Pending  


 


Hepatitis A IgM Antibody Pending  


 


Hepatitis B Surface Antigen Pending  


 


Hepatitis B Core IgM Antibody Pending  


 


Hepatitis C Antibody Pending  


 


HIV-1 RNA (PCR) log10 Value Pending  


 


HIV-1 RNA Ultraquantitative


(PCR) Pending  


 


 


HIV (1&2) Antibody Rapid


  Negative


(NEGATIVE)











Microbiology








 Date/Time


Source Procedure


Growth Status


 


 


 9/3/20 18:15


Blood Blood Culture - Preliminary


NO GROWTH AFTER 24 HOURS Resulted


 


 9/3/20 18:10


Blood Blood Culture - Preliminary


NO GROWTH AFTER 24 HOURS Resulted


 


 9/3/20 18:40


Nasopharynx SARS-CoV-2 RdRp Gene Assay - Final Complete


 


 9/3/20 20:17


Urine,Clean Catch Urine Culture - Preliminary


Gram Positive Cocci Resulted

















Intake and Output  


 


 9/4/20 9/5/20





 19:00 07:00


 


Intake Total 2071.520 ml 385.000 ml


 


Output Total 440 ml 


 


Balance 1631.520 ml 385.000 ml


 


  


 


Intake Oral 420 ml 


 


IV Total 1651.520 ml 385.000 ml


 


Output Urine Total 440 ml 








Objective


General: No acute distress, awake and alert


HEENT: NCAT, sclera anicteric, PERRL, EOMI.


Neck: Supple, no significant jugular venous distention, 


Lungs: Good inspiratory effort, no accessory muscle use, clear to auscultation 

bilaterally, no Wheeze or Rales.


Heart: Regular rate and rhythm, normal S1/S2, no murmurs.


Abdomen: soft, nontender, nondistended. Normoactive bowel sounds, mild obesity.


 / Rectal: Refused and deferred.


Extremities: No Cyanosis , clubbing or edema. 


Neuro: A&O x 3, Able to move all extremities


Skin: warm, no rashes or lesions


Psych: Normal mood and affect





Assessment/Plan


Assessment/Plan


ASSESSMENT:


1. Sepsis most likely due to UTI.


2. Leukocytosis with high fever.


3. Hypertension.


4. Alcoholism with alcohol withdrawal.


5. Homeless.





PLAN:  


in Medical Unit 


Abx: Vancomycin and Cefepime.  


Follow up with cultures, 2D echo, HIV test, RPR, and viral load.  


COVID-19 PCR test negative X 2


Dr. Kitchen consultation with Infectious Disease


Dr. Cook with Pulmonary/Critical Care.  


Code status: Full Code.  


DVT prophylaxis: Heparin subcu.











Shakeel Marrufo MD Sep 5, 2020 22:46

## 2020-09-05 NOTE — HISTORY AND PHYSICAL REPORT
DATE OF ADMISSION:  09/03/2020

CHIEF COMPLAINT:  Alcohol intoxication.



HISTORY OF PRESENT ILLNESS:  This is a 55-year-old 

gentleman with past medical history significant for hypertension,

homelessness, and alcohol abuse, who presented to the emergency department

via ambulance after he was found on the street with unsteady gait and

tremors.  The patient was just discharged from [facility] a day

ago with the same reason.  Shortly after initial evaluation in the

emergency department, it was noted the patient has fever of 104.3 and

subsequently, the patient was admitted to ICU for possible sepsis.



PAST MEDICAL HISTORY/PAST SURGICAL HISTORY:  As above.  History of

hypertension, homelessness, and alcohol abuse.



MEDICATIONS:  At home, significant for amlodipine, _____ folic acid,

labetalol, Robaxin, meloxicam, naproxen, and thiamine.



ALLERGIES:  No known drug allergies.



SOCIAL HISTORY:  The patient with alcohol abuse, homeless.  Very limited

secondary to the patient's status.  He is not able to give appropriate

history.



FAMILY HISTORY:  Noncontributory.



REVIEW OF SYSTEMS:  Mostly as above.  Fever, chills, and altered mental

status.



PHYSICAL EXAMINATION:

VITAL SIGNS:  On admission, initial temperature 104.3 and repeat one is

101.0, pulse of 120, respirations 16, and blood pressure 175/95.

GENERAL:  The patient is awake and responsive; however, cannot follow

commands.

HEAD AND NECK:  Pupils are equal and reactive to light.  Extraocular

movements intact.

NECK:  Supple.  No JVD.

LUNGS:  Good air entry.  Decreased air in the bases.  No rales or

crackles.

HEART:  S1, S2.  Tachycardic.

ABDOMEN:  Soft, nondistended, nontender.  Positive bowel sounds.

EXTREMITIES:  No cyanosis, clubbing, or edema.

NEUROLOGIC:  Limited secondary to the patient's status; however, the

patient is moving extremities spontaneously.



LABORATORY AND DIAGNOSTIC DATA:  Laboratory on admission from the emergency

department, WBC of 17, hemoglobin of 12, hematocrit 36, platelet is

258,000.  Sodium 137, potassium 3.7, chloride 103, bicarb 22, BUN 17,

creatinine 1.2, glucose 114, and calcium is 10.2.  Total bilirubin of 0.2,

AST of 46, ALT of 40, alkaline phosphatase 57.  Lipase is 116.  Troponin

0.00.  Alcohol level is less than 3.  Urinalysis, +1 protein, +1 ketone,

10-15 rbc, no squamous cell, and occasional bacteria.  The patient had

rapid COVID test and is negative.  Chest x-ray, no acute process.



ASSESSMENT:

1. Sepsis, possible due to COVID-19 infection versus bacteremia.

2. Leukocytosis with high fever.

3. Hypertension.

4. Alcoholism with alcohol withdrawal.

5. Homeless.



PLAN:  Admit the patient to ICU.  We will follow up with broad-spectrum

antibiotics with vancomycin and cefepime.  We will follow up with

cultures, 2D echo, HIV test, RPR, and viral load.  We will follow up with

repeat COVID-19 PCR test.  Follow up with Dr. Kitchen consultation with

Infectious Disease and Dr. Cook with Pulmonary/Critical Care.  Code

status at this time is Full Code.  DVT prophylaxis, heparin subcu.  Follow

up with alcohol withdrawal precautions.









  ______________________________________________

  Shakeel Marrufo M.D. DR:  Dong

D:  09/04/2020 21:11

T:  09/04/2020 23:03

JOB#:  [id number]

CC:

## 2020-09-05 NOTE — NUR
NURSE HAND-OFF: 



Important Events on Shift: Hypertensive episodes x2, restarted Norvasc and Labetalol with 
improved BP, Covid negative, isolation precautions discontinued

Patient Status: Stable

Diet: Regular



Pending Orders: None

Pending Results/Labs: Labs for griselda

Pending MD notification: Negative covid result for ID doctor



Latest Vital Signs: Temperature 98.2 , Pulse 90 , B/P 140 /86 , Respiratory Rate 21 , O2 SAT 
94 , Room Air, O2 Flow Rate 3.0 .  

Vital Sign Comment: Stable



Latest Brennan Fall Score: 60  

Fall Risk: High Risk 

Safety Measures: Call light Within Reach, Bed Alarm Zone 1, Side Rails Side Rails x3, Bed 
position Low and Locked.

Fall Precautions: 

Yellow Socks



Report given to Tyrone ROBERSON.

## 2020-09-05 NOTE — NUR
NURSE HAND-OFF: 



Important Events on Shift:[bowel movement x1, formed stool

Patient Status: sleeping

Diet: regular



Pending Orders: vancomycin trough 9/6 at 1100, collect c. diff if diarrhea

Pending Results/Labs:vancomycin trough 9/6 at 1100, collect c. diff if diarrhea, pending 
COVID results

Pending MD notification:na



Latest Vital Signs: Temperature 97.0 , Pulse 87 , B/P 140 /93 , Respiratory Rate 20 , O2 SAT 
96 , Room Air, O2 Flow Rate 3.0 .  

Vital Sign Comment: stable



Latest Brennan Fall Score: 60  

Fall Risk: High Risk 

Safety Measures: Call light Within Reach, Bed Alarm Zone 1, Side Rails Side Rails x3, Bed 
position Low and Locked.

Fall Precautions: 

Yellow Socks



Report given to DA vásquez

## 2020-09-05 NOTE — NUR
NURSE NOTES:

Received orders from Dr. Marrufo to restart home BP meds. Orders noted and carried out.

## 2020-09-05 NOTE — NUR
NURSE NOTES:

Report received from Nathan ROBERSON. Patient seen on rounds, asleep but easily rousable, not in 
distress, tolerating room air, afebrile overnight. PIV on right AC and left upper arm patent 
and intact. Condom catheter secured and draining well. Isolation precautions maintained. Bed 
low and locked, siderails up x2, call light placed within reach and instructed to call nurse 
for assistance. Will continue to monitor.

## 2020-09-06 VITALS — SYSTOLIC BLOOD PRESSURE: 158 MMHG | DIASTOLIC BLOOD PRESSURE: 100 MMHG

## 2020-09-06 VITALS — DIASTOLIC BLOOD PRESSURE: 81 MMHG | SYSTOLIC BLOOD PRESSURE: 135 MMHG

## 2020-09-06 VITALS — DIASTOLIC BLOOD PRESSURE: 82 MMHG | SYSTOLIC BLOOD PRESSURE: 139 MMHG

## 2020-09-06 VITALS — SYSTOLIC BLOOD PRESSURE: 137 MMHG | DIASTOLIC BLOOD PRESSURE: 96 MMHG

## 2020-09-06 VITALS — SYSTOLIC BLOOD PRESSURE: 136 MMHG | DIASTOLIC BLOOD PRESSURE: 88 MMHG

## 2020-09-06 VITALS — SYSTOLIC BLOOD PRESSURE: 131 MMHG | DIASTOLIC BLOOD PRESSURE: 79 MMHG

## 2020-09-06 LAB
ADD MANUAL DIFF: NO
ALBUMIN SERPL-MCNC: 2.3 G/DL (ref 3.4–5)
ALBUMIN/GLOB SERPL: 0.4 {RATIO} (ref 1–2.7)
ALP SERPL-CCNC: 53 U/L (ref 46–116)
ALT SERPL-CCNC: 34 U/L (ref 12–78)
ANION GAP SERPL CALC-SCNC: 12 MMOL/L (ref 5–15)
AST SERPL-CCNC: 28 U/L (ref 15–37)
BASOPHILS NFR BLD AUTO: 1 % (ref 0–2)
BILIRUB SERPL-MCNC: 0.2 MG/DL (ref 0.2–1)
BUN SERPL-MCNC: 13 MG/DL (ref 7–18)
CALCIUM SERPL-MCNC: 9.2 MG/DL (ref 8.5–10.1)
CHLORIDE SERPL-SCNC: 106 MMOL/L (ref 98–107)
CO2 SERPL-SCNC: 22 MMOL/L (ref 21–32)
CREAT SERPL-MCNC: 1 MG/DL (ref 0.55–1.3)
EOSINOPHIL NFR BLD AUTO: 0.6 % (ref 0–3)
ERYTHROCYTE [DISTWIDTH] IN BLOOD BY AUTOMATED COUNT: 14.4 % (ref 11.6–14.8)
GLOBULIN SER-MCNC: 5.2 G/DL
HCT VFR BLD CALC: 35.1 % (ref 42–52)
HGB BLD-MCNC: 11.5 G/DL (ref 14.2–18)
LYMPHOCYTES NFR BLD AUTO: 12.2 % (ref 20–45)
MCV RBC AUTO: 93 FL (ref 80–99)
MONOCYTES NFR BLD AUTO: 7.4 % (ref 1–10)
NEUTROPHILS NFR BLD AUTO: 78.9 % (ref 45–75)
PHOSPHATE SERPL-MCNC: 4.1 MG/DL (ref 2.5–4.9)
PLATELET # BLD: 331 K/UL (ref 150–450)
POTASSIUM SERPL-SCNC: 3.5 MMOL/L (ref 3.5–5.1)
RBC # BLD AUTO: 3.77 M/UL (ref 4.7–6.1)
SODIUM SERPL-SCNC: 140 MMOL/L (ref 136–145)
WBC # BLD AUTO: 11.2 K/UL (ref 4.8–10.8)

## 2020-09-06 RX ADMIN — SODIUM CHLORIDE SCH MLS/HR: 0.9 INJECTION INTRAVENOUS at 06:05

## 2020-09-06 RX ADMIN — Medication SCH MLS/HR: at 00:10

## 2020-09-06 RX ADMIN — Medication SCH MLS/HR: at 12:00

## 2020-09-06 RX ADMIN — HEPARIN SODIUM SCH UNITS: 5000 INJECTION INTRAVENOUS; SUBCUTANEOUS at 08:14

## 2020-09-06 RX ADMIN — SODIUM CHLORIDE SCH MLS/HR: 9 INJECTION, SOLUTION INTRAVENOUS at 20:00

## 2020-09-06 RX ADMIN — HEPARIN SODIUM SCH UNITS: 5000 INJECTION INTRAVENOUS; SUBCUTANEOUS at 21:08

## 2020-09-06 RX ADMIN — ZOLPIDEM TARTRATE PRN MG: 5 TABLET ORAL at 20:58

## 2020-09-06 RX ADMIN — FOLIC ACID SCH MLS/HR: 5 INJECTION, SOLUTION INTRAMUSCULAR; INTRAVENOUS; SUBCUTANEOUS at 08:48

## 2020-09-06 RX ADMIN — SODIUM CHLORIDE AND POTASSIUM CHLORIDE SCH MLS/HR: 9; 1.49 INJECTION, SOLUTION INTRAVENOUS at 08:48

## 2020-09-06 RX ADMIN — CHLORDIAZEPOXIDE HYDROCHLORIDE PRN MG: 25 CAPSULE ORAL at 08:48

## 2020-09-06 RX ADMIN — SODIUM CHLORIDE SCH MLS/HR: 0.9 INJECTION INTRAVENOUS at 22:00

## 2020-09-06 RX ADMIN — SODIUM CHLORIDE SCH MLS/HR: 0.9 INJECTION INTRAVENOUS at 13:02

## 2020-09-06 NOTE — INTERNAL MED PROGRESS NOTE
Subjective


Physician Name


Shakeel Marrufo


Attending Physician


Shakeel Marrufo MD





Current Medications








 Medications


  (Trade)  Dose


 Ordered  Sig/Yeison


 Route


 PRN Reason  Start Time


 Stop Time Status Last Admin


Dose Admin


 


 Acetaminophen


  (Tylenol)  650 mg  Q4H  PRN


 ORAL


 fever  9/4/20 16:51


 10/4/20 16:50  9/6/20 11:25


 


 


 Amlodipine


 Besylate


  (Norvasc)  10 mg  DAILY


 ORAL


   9/5/20 12:45


 10/5/20 12:44  9/6/20 08:13


 


 


 Cefepime HCl 1 gm/


 Dextrose  55 ml @ 


 110 mls/hr  Q8HR


 IVPB


   9/4/20 22:00


 9/11/20 14:26  9/6/20 13:02


 


 


 Chlordiazepoxide


  (Librium)  25 mg  Q6H  PRN


 ORAL


 ANXIETY  9/4/20 16:51


 9/11/20 16:50  9/6/20 08:48


 


 


 Chlordiazepoxide


  (Librium)  25 mg  Q6H  PRN


 ORAL


 TACHYCARDIA  9/4/20 16:51


 9/11/20 16:50   


 


 


 Chlordiazepoxide


  (Librium)  25 mg  Q6H  PRN


 ORAL


 Agitation  9/4/20 16:52


 9/11/20 16:51   


 


 


 Dextrose


  (Dextrose 50%)  25 ml  Q30M  PRN


 IV


 Hypoglycemia  9/4/20 16:52


 12/3/20 16:51   


 


 


 Dextrose


  (Dextrose 50%)  50 ml  Q30M  PRN


 IV


 Hypoglycemia  9/4/20 16:52


 12/3/20 16:51   


 


 


 Folic Acid 1 mg/


 Magnesium Sulfate


 2000 mg/


 Multivitamins 10


 ml/Potassium


 Chloride/Sodium


 Chloride  1,014.2 ml


  @ 124.876


 mls/hr  Q24H


 IV


   9/5/20 09:00


 10/4/20 08:59  9/6/20 08:48


 


 


 Heparin Sodium


  (Porcine)


  (Heparin 5000


 units/ml)  5,000 units  EVERY 12  HOURS


 SUBQ


   9/4/20 21:00


 10/19/20 08:59  9/6/20 08:14


 


 


 Labetalol HCl


  (Normodyne)  400 mg  TID


 ORAL


   9/5/20 13:00


 10/5/20 12:59  9/6/20 13:02


 


 


 Ondansetron HCl


  (Zofran)  4 mg  Q6H  PRN


 IVP


 Nausea & Vomiting  9/4/20 16:52


 10/4/20 16:51   


 


 


 Polyethylene


 Glycol


  (Miralax)  17 gm  HSPRN  PRN


 ORAL


 Constipation  9/4/20 16:52


 10/4/20 16:51   


 


 


 Thiamine HCl 100


 mg/Dextrose  56 ml @ 


 112 mls/hr  Q24H


 IVPB


   9/5/20 09:00


 10/4/20 08:59  9/6/20 08:48


 


 


 Vancomycin HCl


  (Vanco pharmacy


 to dose)  1 ea  DAILY  PRN


 MISC


 Per rx protocol  9/5/20 09:00


 10/4/20 10:29   


 


 


 Vancomycin HCl


 750 mg/Sodium


 Chloride  275 ml @ 


 183.333


 mls/hr  Q12H


 IVPB


   9/6/20 20:00


 9/11/20 19:59   


 


 


 Zolpidem Tartrate


  (Ambien)  5 mg  HSPRN  PRN


 ORAL


 Insomnia  9/4/20 16:52


 9/11/20 16:51  9/5/20 20:51


 








Allergies:  


Coded Allergies:  


     No Known Allergies (Unverified , 11/18/15)


Subjective


awake, alert, responsive, No CP or SOB, WBC: 15.9, C/O weakness, Urine: GPC





Objective





Last Vital Signs








  Date Time  Temp Pulse Resp B/P (MAP) Pulse Ox O2 Delivery O2 Flow Rate FiO2


 


9/6/20 16:00 98.8 79 18 136/88 (104) 95   


 


9/6/20 09:00      Room Air  


 


9/4/20 12:00       3.0 











Laboratory Tests








Test


  9/6/20


07:15 9/6/20


11:15


 


White Blood Count


  11.2 K/UL


(4.8-10.8)  H 


 


 


Red Blood Count


  3.77 M/UL


(4.70-6.10)  L 


 


 


Hemoglobin


  11.5 G/DL


(14.2-18.0)  L 


 


 


Hematocrit


  35.1 %


(42.0-52.0)  L 


 


 


Mean Corpuscular Volume 93 FL (80-99)   


 


Mean Corpuscular Hemoglobin


  30.5 PG


(27.0-31.0) 


 


 


Mean Corpuscular Hemoglobin


Concent 32.7 G/DL


(32.0-36.0) 


 


 


Red Cell Distribution Width


  14.4 %


(11.6-14.8) 


 


 


Platelet Count


  331 K/UL


(150-450) 


 


 


Mean Platelet Volume


  6.8 FL


(6.5-10.1) 


 


 


Neutrophils (%) (Auto)


  78.9 %


(45.0-75.0)  H 


 


 


Lymphocytes (%) (Auto)


  12.2 %


(20.0-45.0)  L 


 


 


Monocytes (%) (Auto)


  7.4 %


(1.0-10.0) 


 


 


Eosinophils (%) (Auto)


  0.6 %


(0.0-3.0) 


 


 


Basophils (%) (Auto)


  1.0 %


(0.0-2.0) 


 


 


Erythrocyte Sedimentation Rate


  86 MM/HR


(0-20)  H 


 


 


Sodium Level


  140 MMOL/L


(136-145) 


 


 


Potassium Level


  3.5 MMOL/L


(3.5-5.1) 


 


 


Chloride Level


  106 MMOL/L


() 


 


 


Carbon Dioxide Level


  22 MMOL/L


(21-32) 


 


 


Anion Gap


  12 mmol/L


(5-15) 


 


 


Blood Urea Nitrogen


  13 mg/dL


(7-18) 


 


 


Creatinine


  1.0 MG/DL


(0.55-1.30) 


 


 


Estimat Glomerular Filtration


Rate > 60 mL/min


(>60) 


 


 


Glucose Level


  111 MG/DL


()  H 


 


 


Calcium Level


  9.2 MG/DL


(8.5-10.1) 


 


 


Phosphorus Level


  4.1 MG/DL


(2.5-4.9) 


 


 


Magnesium Level


  1.7 MG/DL


(1.8-2.4)  L 


 


 


Total Bilirubin


  0.2 MG/DL


(0.2-1.0) 


 


 


Aspartate Amino Transf


(AST/SGOT) 28 U/L (15-37)


  


 


 


Alanine Aminotransferase


(ALT/SGPT) 34 U/L (12-78)


  


 


 


Alkaline Phosphatase


  53 U/L


() 


 


 


C-Reactive Protein,


Quantitative 4.4 mg/dL


(0.00-0.90)  H 


 


 


Total Protein


  7.5 G/DL


(6.4-8.2) 


 


 


Albumin


  2.3 G/DL


(3.4-5.0)  L 


 


 


Globulin 5.2 g/dL   


 


Albumin/Globulin Ratio


  0.4 (1.0-2.7)


L 


 


 


Vancomycin Level Trough


  


  23.0 ug/mL


(5.0-12.0)  H











Microbiology








 Date/Time


Source Procedure


Growth Status


 


 


 9/4/20 10:50


Blood Blood Culture - Preliminary


NO GROWTH AFTER 24 HOURS Resulted


 


 9/4/20 10:40


Blood Blood Culture - Preliminary


NO GROWTH AFTER 24 HOURS Resulted


 


 9/3/20 18:15


Blood Blood Culture - Preliminary


NO GROWTH AFTER 48 HOURS Resulted


 


 9/3/20 18:10


Blood Blood Culture - Preliminary Resulted





 9/4/20 16:00


Sputum Gram Stain


Pending Resulted


 


 9/4/20 16:00


Sputum Sputum Culture - Preliminary


NORMAL UPPER RESPIRATORY RONALD AT 24 ... Resulted


 


 9/3/20 20:52


Nasal Nares MRSA Culture - Final


NO METHICILLIN RESISTANT STAPH AUREUS... Complete


 


 9/3/20 18:40


Nasopharynx SARS-CoV-2 RdRp Gene Assay - Final Complete


 


 9/5/20 00:00


Indwelling Cath Urine Culture - Preliminary


NO GROWTH Resulted


 


 9/4/20 13:40


Urine,Clean Catch Urine Culture - Preliminary


NO GROWTH Resulted


 


 9/3/20 20:17


Urine,Clean Catch Urine Culture - Final


Gram Positive Cocci Complete





 9/4/20 15:00


Back Gram Stain - Final Resulted


 


 9/4/20 15:00


Back Wound Culture - Preliminary


NO GROWTH Resulted


 


 9/3/20 20:52


Rectum - Final


NO CARBAPENEM-RESISTANT ENTEROBACTERI... Complete


 


 9/3/20 20:52


Rectum VRE Culture - Final


Enterococcus Faecalis - Vre Complete

















Intake and Output  


 


 9/5/20 9/6/20





 19:00 07:00


 


Intake Total 1509.2 ml 880.000 ml


 


Output Total  2300 ml


 


Balance 1509.2 ml -1420.000 ml


 


  


 


Intake Oral  550 ml


 


IV Total 1509.2 ml 330.000 ml


 


Output Urine Total  2300 ml


 


# Bowel Movements  2








Objective


General: No acute distress, awake and alert


HEENT: NCAT, sclera anicteric, PERRL, EOMI.


Neck: Supple, no significant jugular venous distention, 


Lungs: Good inspiratory effort, no accessory muscle use, clear to auscultation 

bilaterally, no Wheeze or Rales.


Heart: Regular rate and rhythm, normal S1/S2, no murmurs.


Abdomen: soft, nontender, nondistended. Normoactive bowel sounds, mild obesity.


 / Rectal: Refused and deferred.


Extremities: No Cyanosis , clubbing or edema. 


Neuro: A&O x 3, Able to move all extremities


Skin: warm, no rashes or lesions


Psych: Normal mood and affect





Assessment/Plan


Assessment/Plan


ASSESSMENT:


1. Sepsis most likely due to UTI.


2. Leukocytosis with high fever.


3. Hypertension.


4. Alcoholism with alcohol withdrawal.


5. Homeless.





PLAN:  


in Medical Unit 


Abx: Vancomycin and Cefepime.  


Follow up with cultures, 2D echo, HIV test, RPR, and viral load.  


COVID-19 PCR test negative X 2


Dr. Kitchen consultation with Infectious Disease


Dr. Cook with Pulmonary/Critical Care.  


Code status: Full Code.  


DVT prophylaxis: Heparin subcu.


DC planning.











Shakeel Marrufo MD Sep 6, 2020 16:52

## 2020-09-06 NOTE — NUR
NURSE NOTES:

PT AXOX4, CALM, RESTING AND EATING BREAKFAST IN BED. PT COMPLAINS OF WEAKNESS AND DIFFICULTY 
WALKING. PT STATED IT STARTED AFTER BEING DISCHARGED FROM [facility]. PT IS ABLE TO 
MOVE ALL 4 EXTREMITIES, STRENGTH IS 3-4/5. ABLE TO EAT INDEPENDENTLY. RN LEFT MESSAGE FOR DR MCGRATH FOR PHYSICAL THERAPY EVAL AND WOUND CARE CONSULT WITH DR CLARK. PT ON CONDOM CATH, 
DRAINING CLEAR YELLOW URINE BY GRAVITY. BED IN LOWEST POSITION WITH BEDSIDE RAILS X3. CALL 
LIGHT WITHIN REACH. WILL CONTINUE TO MONITOR.

## 2020-09-06 NOTE — NUR
NURSE HAND-OFF: 



Important Events on Shift:[COVID NEGATIVE, VRE rectum Positive]

Patient Status: [Stable]

Diet: [Regular]



Pending Orders: []

Pending Results/Labs:[]

Pending MD notification:[]



Latest Vital Signs: Temperature 98.1 , Pulse 80 , B/P 131 /79 , Respiratory Rate 18 , O2 SAT 
95 , Room Air, O2 Flow Rate 3.0 .  

Vital Sign Comment: []



Latest Brennan Fall Score: 45  

Fall Risk: High Risk 

Safety Measures: Call light Within Reach, Bed Alarm Zone 1, Side Rails Side Rails x3, Bed 
position Low and Locked.

Fall Precautions: 

Yellow Socks



Report given to [VALENCIA BULLARD RN].

## 2020-09-06 NOTE — NUR
NURSE NOTES:

DR CARMONA WITH CALLL BACK, AND MADE AWARE OF GRAM POSITIVE RODS X1 BOTTLE FROM BLOOD 
CULTURE. MD ALSO MADE AWARE OF RESOLVED FEVER. NO NEW ORDERS RECEIVED. PT TAKEN DOWN FOR 
STAT CT HEAD.

## 2020-09-06 NOTE — NUR
NURSE NOTES:

Pt is in bed, awake and verbal. No acute distress noted. Room air.No SOB. No cough. Banana 
bag currently infusing via right hand. Pt had physical therapy today. Pt is calm in bed. Bed 
locked low in position,side rails up and call light within reach. Pt will be monitored.

## 2020-09-06 NOTE — INFECTIOUS DISEASES PROG NOTE
Assessment/Plan


Abx:


IV Vancomycin 9/3-


IV amikacin 9/4-





Assessment:


COVID19 neg x1 (9/3 rapid PCR neg)





Sepsis vs SIRS- ?source of infection


r/o probable bacteremia, UTI, PNA


    UCx 9/3/20 - GPC


    BCx 9/3/20 - NGTD





High fever


Leukocytosis, worsening


   -u/a wbc 10-15, nit neg, leuk est neg


   -CXR  report pending (per ER interpretation: no consolidation, no effusion, 

no pneumothorax, no acute cardiopulmonary disease)


   -Bcx p





Alcohol withdrawal





HTN


homelessness


ETOH abuse





HIV (Neg)





Plan:


-Continue empiric IV Vancomycin #3  Cefepime #2





    - 9/4/20 SP Amikacin #1


-f/u cx


-Monitor CBC/CMP, temperatures


-2d echo, RPR


-COVID19 isolation and testing; repeat 2nd COVID PCR


-Cdiff if diarrhea





Thank  you for consulting [facility].  Will continue to follow along with 

you.





Subjective


Allergies:  


Coded Allergies:  


     No Known Allergies (Unverified , 11/18/15)


Afebrile


Leukocytosis resolving


Urine Cx GPC





Objective





Last 24 Hour Vital Signs








  Date Time  Temp Pulse Resp B/P (MAP) Pulse Ox O2 Delivery O2 Flow Rate FiO2


 


9/6/20 09:00      Room Air  


 


9/6/20 08:13  92  158/100    


 


9/6/20 08:13  92  158/100    


 


9/6/20 07:42 97.9 92 19 158/100 (119) 94   


 


9/6/20 04:00 98.1 80 18 131/79 (96) 95   


 


9/6/20 00:00 97.7 82 18 135/81 (99) 95   


 


9/5/20 21:00      Room Air  


 


9/5/20 20:00 97.9 91 18 138/82 (100) 96   


 


9/5/20 17:24  90  140/86    


 


9/5/20 15:58 98.2 90 21 140/86 (104) 94   


 


9/5/20 14:10  85  156/99    


 


9/5/20 14:09  85  156/99    


 


9/5/20 12:00 98.2 85 18 156/99 (118) 98   








Height (Feet):  5


Height (Inches):  7.00


Weight (Pounds):  160


GEN: NAD


HEENT: NCAT, MMM, EOMI


Pulm: Equal chest rise and fall B/L, No accessory muscle use


ABD: Soft, ND


SKIN: Exposed skin with no rash, Normal in color





Microbiology








 Date/Time


Source Procedure


Growth Status


 


 


 9/4/20 10:50


Blood Blood Culture - Preliminary


NO GROWTH AFTER 24 HOURS Resulted


 


 9/4/20 10:40


Blood Blood Culture - Preliminary


NO GROWTH AFTER 24 HOURS Resulted


 


 9/3/20 18:15


Blood Blood Culture - Preliminary


NO GROWTH AFTER 48 HOURS Resulted


 


 9/3/20 18:10


Blood Blood Culture - Preliminary


NO GROWTH AFTER 48 HOURS Resulted





 9/4/20 16:00


Sputum Gram Stain


Pending Resulted


 


 9/4/20 16:00


Sputum Sputum Culture - Preliminary


NORMAL UPPER RESPIRATORY RONALD AT 24 ... Resulted


 


 9/3/20 18:40


Nasopharynx SARS-CoV-2 RdRp Gene Assay - Final Complete


 


 9/5/20 00:00


Indwelling Cath Urine Culture - Preliminary


NO GROWTH Resulted


 


 9/4/20 13:40


Urine,Clean Catch Urine Culture - Preliminary


NO GROWTH Resulted


 


 9/3/20 20:17


Urine,Clean Catch Urine Culture - Final


Gram Positive Cocci Complete





 9/4/20 15:00


Back Gram Stain - Final Resulted


 


 9/4/20 15:00


Back Wound Culture - Preliminary


NO GROWTH Resulted


 


 9/3/20 20:52


Rectum - Final


NO CARBAPENEM-RESISTANT ENTEROBACTERI... Complete


 


 9/3/20 20:52


Rectum VRE Culture - Final


Enterococcus Faecalis - Vre Complete











Laboratory Tests








Test


  9/6/20


07:15


 


White Blood Count


  11.2 K/UL


(4.8-10.8)  H


 


Red Blood Count


  3.77 M/UL


(4.70-6.10)  L


 


Hemoglobin


  11.5 G/DL


(14.2-18.0)  L


 


Hematocrit


  35.1 %


(42.0-52.0)  L


 


Mean Corpuscular Volume 93 FL (80-99)  


 


Mean Corpuscular Hemoglobin


  30.5 PG


(27.0-31.0)


 


Mean Corpuscular Hemoglobin


Concent 32.7 G/DL


(32.0-36.0)


 


Red Cell Distribution Width


  14.4 %


(11.6-14.8)


 


Platelet Count


  331 K/UL


(150-450)


 


Mean Platelet Volume


  6.8 FL


(6.5-10.1)


 


Neutrophils (%) (Auto)


  78.9 %


(45.0-75.0)  H


 


Lymphocytes (%) (Auto)


  12.2 %


(20.0-45.0)  L


 


Monocytes (%) (Auto)


  7.4 %


(1.0-10.0)


 


Eosinophils (%) (Auto)


  0.6 %


(0.0-3.0)


 


Basophils (%) (Auto)


  1.0 %


(0.0-2.0)


 


Erythrocyte Sedimentation Rate


  86 MM/HR


(0-20)  H


 


Sodium Level


  140 MMOL/L


(136-145)


 


Potassium Level


  3.5 MMOL/L


(3.5-5.1)


 


Chloride Level


  106 MMOL/L


()


 


Carbon Dioxide Level


  22 MMOL/L


(21-32)


 


Anion Gap


  12 mmol/L


(5-15)


 


Blood Urea Nitrogen


  13 mg/dL


(7-18)


 


Creatinine


  1.0 MG/DL


(0.55-1.30)


 


Estimat Glomerular Filtration


Rate > 60 mL/min


(>60)


 


Glucose Level


  111 MG/DL


()  H


 


Calcium Level


  9.2 MG/DL


(8.5-10.1)


 


Phosphorus Level


  4.1 MG/DL


(2.5-4.9)


 


Magnesium Level


  1.7 MG/DL


(1.8-2.4)  L


 


Total Bilirubin


  0.2 MG/DL


(0.2-1.0)


 


Aspartate Amino Transf


(AST/SGOT) 28 U/L (15-37)


 


 


Alanine Aminotransferase


(ALT/SGPT) 34 U/L (12-78)


 


 


Alkaline Phosphatase


  53 U/L


()


 


C-Reactive Protein,


Quantitative 4.4 mg/dL


(0.00-0.90)  H


 


Total Protein


  7.5 G/DL


(6.4-8.2)


 


Albumin


  2.3 G/DL


(3.4-5.0)  L


 


Globulin 5.2 g/dL  


 


Albumin/Globulin Ratio


  0.4 (1.0-2.7)


L











Current Medications








 Medications


  (Trade)  Dose


 Ordered  Sig/Yeison


 Route


 PRN Reason  Start Time


 Stop Time Status Last Admin


Dose Admin


 


 Acetaminophen


  (Tylenol)  650 mg  Q4H  PRN


 ORAL


 fever  9/4/20 16:51


 10/4/20 16:50   


 


 


 Amlodipine


 Besylate


  (Norvasc)  10 mg  DAILY


 ORAL


   9/5/20 12:45


 10/5/20 12:44  9/6/20 08:13


 


 


 Cefepime HCl 1 gm/


 Dextrose  55 ml @ 


 110 mls/hr  Q8HR


 IVPB


   9/4/20 22:00


 9/11/20 14:26  9/6/20 06:05


 


 


 Chlordiazepoxide


  (Librium)  25 mg  Q6H  PRN


 ORAL


 ANXIETY  9/4/20 16:51


 9/11/20 16:50  9/6/20 08:48


 


 


 Chlordiazepoxide


  (Librium)  25 mg  Q6H  PRN


 ORAL


 TACHYCARDIA  9/4/20 16:51


 9/11/20 16:50   


 


 


 Chlordiazepoxide


  (Librium)  25 mg  Q6H  PRN


 ORAL


 Agitation  9/4/20 16:52


 9/11/20 16:51   


 


 


 Dextrose


  (Dextrose 50%)  25 ml  Q30M  PRN


 IV


 Hypoglycemia  9/4/20 16:52


 12/3/20 16:51   


 


 


 Dextrose


  (Dextrose 50%)  50 ml  Q30M  PRN


 IV


 Hypoglycemia  9/4/20 16:52


 12/3/20 16:51   


 


 


 Folic Acid 1 mg/


 Magnesium Sulfate


 2000 mg/


 Multivitamins 10


 ml/Potassium


 Chloride/Sodium


 Chloride  1,014.2 ml


  @ 124.876


 mls/hr  Q24H


 IV


   9/5/20 09:00


 10/4/20 08:59  9/6/20 08:48


 


 


 Heparin Sodium


  (Porcine)


  (Heparin 5000


 units/ml)  5,000 units  EVERY 12  HOURS


 SUBQ


   9/4/20 21:00


 10/19/20 08:59  9/6/20 08:14


 


 


 Labetalol HCl


  (Normodyne)  400 mg  TID


 ORAL


   9/5/20 13:00


 10/5/20 12:59  9/6/20 08:13


 


 


 Magnesium Sulfate  100 ml @ 


 100 mls/hr  Q1H


 IVPB


   9/6/20 09:00


 9/6/20 10:59  9/6/20 09:36


 


 


 Ondansetron HCl


  (Zofran)  4 mg  Q6H  PRN


 IVP


 Nausea & Vomiting  9/4/20 16:52


 10/4/20 16:51   


 


 


 Polyethylene


 Glycol


  (Miralax)  17 gm  HSPRN  PRN


 ORAL


 Constipation  9/4/20 16:52


 10/4/20 16:51   


 


 


 Thiamine HCl 100


 mg/Dextrose  56 ml @ 


 112 mls/hr  Q24H


 IVPB


   9/5/20 09:00


 10/4/20 08:59  9/6/20 08:48


 


 


 Vancomycin HCl


  ([facility] pharmacy


 to dose)  1 ea  DAILY  PRN


 MISC


 Per rx protocol  9/5/20 09:00


 10/4/20 10:29   


 


 


 Vancomycin/Sodium


 Chloride  275 ml @ 


 183.333


 mls/hr  Q12H


 IVPB


   9/5/20 00:00


 9/10/20 00:00  9/6/20 00:10


 


 


 Zolpidem Tartrate


  (Ambien)  5 mg  HSPRN  PRN


 ORAL


 Insomnia  9/4/20 16:52


 9/11/20 16:51  9/5/20 20:51


 

















Tyler Renner MD Sep 6, 2020 10:21

## 2020-09-06 NOTE — NUR
NURSE NOTES:

PT SATTES HE HAS A NEW ONSET OF RIGHT SIDED WEAKNESS AFTER A FALL, PREVIOUS TO HIS 
ADMISSION. DURING PHYSICAL THERAPY, PT HAS PROFOUND WEAKNESS OF RIGHT SIDE AFFECTING 
BALANCE. RN MADE DR MCGRATH AWARE WITH NEW ORDER FOR CT HEAD WITHOUT CONTRAST STAT. PT WITH 
ELEVATED TEMPERATURE 100.9, WARM TO TOUCH. RN ADMINISTERED PRN TYLENOL 650MG FOR FEVER AND 
APPLIED COLD COMPRESSES. UPON REASSESSMENT, PT TEMPERATURE WAS 98.8F. IN NO APPARENT 
DISTRESS AT THIS TIME. VSS. WILL CONTINUE TO MONITOR.

## 2020-09-06 NOTE — NUR
NURSE HAND-OFF: 



Important Events on Shift: RESOLVED FEVER 100.9 TODAY. NOW 98.8 AND IN NO APPARENT DISTRESS. 
BLOOD CULTURE 1 BOTTLE GRAM POSITIVE RODS. DR CARMONA WAS MADE AWARE. STAT CT HEAD WITHOUT 
CONTRAST DONE DUE TO PT REPORT OF SUDDEN RIGHT-SIDED WEAKNESS SINCE FRIDAY. 

Patient Status: STABLE

Diet: REGULAR



Pending Orders: N/A

Pending Results/Labs:N/A

Pending MD notification:N/A



Latest Vital Signs: Temperature 98.8 , Pulse 79 , B/P 136 /88 , Respiratory Rate 18 , O2 SAT 
95 , Room Air, O2 Flow Rate 3.0 .  

Vital Sign Comment: STABLE



Latest Brennan Fall Score: 60  

Fall Risk: High Risk 

Safety Measures: Call light Within Reach, Bed Alarm Zone 1, Side Rails Side Rails x3, Bed 
position Low and Locked.

Fall Precautions: 

Yellow Socks



Report given to LUI MACEDO RN

## 2020-09-06 NOTE — NUR
*-* INSURANCE *-*



UPDATED CLINICALS AND REVIEWS HAVE BEEN FAXED TO:



AUTH#[id number] 

[facility]

P:

F:[phone]

## 2020-09-06 NOTE — NUR
CASE MANAGEMENT: INITIAL REVIEW



55YR OLD MALE FROM HOME 



CC:ALCOHOL INTOXATION; SOB 





SI:SEPSIS /ETOH WITH DRAWL TOXIC EXPOSURE 

104.3   120   16   175/95   98% ON RA

WBC 17.7  CA+ 10.2   SERUM ALCOHOL <3 

URINE + WBC 10-15   +KETONES 



IS:IV VANCOMYCIN X2

IV ATIVAN X1

PHENOBARBITAL X2

IV VERSED X1

CT HEAD -No acute process



\**: INTENSIVE CARE UNIT 

DCP: HOME WHEN STABLE 

PLAN:

MONITOR IN ICU 







CASE MANAGEMENT: REVIEW



09/04/20



SI:SEPSIS /ETOH WITH DRAWL TOXIC EXPOSURE 

101.1   110   20   158/95   100% ON2L NC 

WBC 25.8   K+ 3.4   ALB 2.4  

URINE WBC ~INCREASING RBC ~INCREASING 



IS:IV VANCOMYCIN X1

IV AMIKACIN QD

IV CEFEPIME TID



\**: INTENSIVE CARE UNIT ~ TRANSFER TO MED SURG UNIT 

DCP: HOME WHEN STABLE 

PLAN:

CONT IV ABX 

CONTROL FEVERS 





CASE MANAGEMENT: REVIEW



09/05/20



SI:SEPSIS /ETOH WITH DRAWL TOXIC EXPOSURE 

97.0   87   20   140/93   96% ON RA

WBC 15.9   ESR 71   ALB 2.1  



IS:IV VANCOMYCIN X1

IV AMIKACIN QD

IV CEFEPIME TID



\**: 4E MED SURG UNIT 

DCP: HOME WHEN STABLE 

PLAN:

CONT IV ABX 

MONITOR FOR LOW GRADE FEVERS 





CASE MANAGEMENT: REVIEW



09/06/20



SI:SEPSIS /ETOH WITH DRAWL TOXIC EXPOSURE 

100.9   92   158/100   94% ON RA

VANCO TROUGH 23.0   WBC 11.2   ESR 86  MG 1.7   ALB 2.3  



IS:IV CEFEPIME TID

NORMODYNE PO TID

NORVASC PO QD

IV MG SULFATE X2 BAGS

IV THIAMINE QD

LIBRIUM PO Q6HR/PRN

CT HEAD-Cervical fusion hardware in place.  Diffuse cortical atrophy 

and periventricular white matter hypoattenuation with prominence of the 

ventricles and CSF spaces.  Carotid and vertebral artery atherosclerosis. 

 No acute intracranial hemorrhage mass-effect or midline shift.  

Paranasal sinuses and mastoid air cells are unremarkable.

 

IMPRESSION:   Age-related changes with no acute intracranial findings.

 

\**: [facility] 

DCP: HOME WHEN STABLE 

PLAN:

CONT IV ABX 

CONTROL FEVERS ~SPIKE 

PT EVAL AND THERAPY RECOMMENDATION

## 2020-09-06 NOTE — NUR
NURSE NOTES:

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 Contact isolation 
initiated. Pt slept well overnight. No acute events. New IV access established on left hand; 
24G. Pt was cleaned and bed linen changed.

## 2020-09-06 NOTE — NUR
NURSE NOTES:

PT IS CALM AND RESTING IN BED. PT NEEDS TO BE REMINDED TO REPOSITION Q2H. PT IS COMPLIANT 
AND VERBALIZES UNDERSTANDING. IN NO APPARENT DISTRESS AT THIS TIME. WILL CONTINUE TO MONITOR 
.

## 2020-09-06 NOTE — NUR
PT Note

PT ruth completed, treatment initiated. Patient has decreased sitting and standing balance 
with generalized weakness and muscle weakness on the RUE/LE, making him at a high risk for 
further falls.  Patient was able to stand with the FWW; knees collapse, R>L on weight 
shifting. Patient needs physical therapy to increase his muscle strength and balance to 
improve his functional mobility. Recommend for patient to be DC'd to a SNF for rehab. 
Patient is not safe to be DC'd to prior living situation. 

-------------------------------------------------------------------------------

Addendum: 09/06/20 at 1234 by YULIA MOODY PT

-------------------------------------------------------------------------------

Amended: Links added.

## 2020-09-06 NOTE — DIAGNOSTIC IMAGING REPORT
CT HEAD Without Contrast

 

HISTORY: weak

 

TECHNIQUE:  One or more of the following dose reduction techniques were 

used: automated exposure control, adjustment of the mA and/or kV 

according to patient size, use of iterative reconstruction technique.

 

One or more of the following dose reduction techniques were used: 

automated exposure control, adjustment of the mA and/or kV according to 

patient size, use of iterative reconstruction technique.

Total Exam volume computed tomography dose index (CTDIvol) =  53.4  mGy 

and Dose Length Product (DLP) = 992.1 mGY-c

 

COMPARISON:   August 25, 2020

 

FINDINGS:   Cervical fusion hardware in place.  Diffuse cortical atrophy 

and periventricular white matter hypoattenuation with prominence of the 

ventricles and CSF spaces.  Carotid and vertebral artery atherosclerosis. 

 No acute intracranial hemorrhage mass-effect or midline shift.  

Paranasal sinuses and mastoid air cells are unremarkable.

 

IMPRESSION:   Age-related changes with no acute intracranial findings.

## 2020-09-07 VITALS — DIASTOLIC BLOOD PRESSURE: 84 MMHG | SYSTOLIC BLOOD PRESSURE: 130 MMHG

## 2020-09-07 VITALS — SYSTOLIC BLOOD PRESSURE: 128 MMHG | DIASTOLIC BLOOD PRESSURE: 80 MMHG

## 2020-09-07 VITALS — DIASTOLIC BLOOD PRESSURE: 82 MMHG | SYSTOLIC BLOOD PRESSURE: 133 MMHG

## 2020-09-07 VITALS — SYSTOLIC BLOOD PRESSURE: 131 MMHG | DIASTOLIC BLOOD PRESSURE: 79 MMHG

## 2020-09-07 VITALS — SYSTOLIC BLOOD PRESSURE: 137 MMHG | DIASTOLIC BLOOD PRESSURE: 86 MMHG

## 2020-09-07 VITALS — DIASTOLIC BLOOD PRESSURE: 96 MMHG | SYSTOLIC BLOOD PRESSURE: 140 MMHG

## 2020-09-07 RX ADMIN — HEPARIN SODIUM SCH UNITS: 5000 INJECTION INTRAVENOUS; SUBCUTANEOUS at 20:41

## 2020-09-07 RX ADMIN — SODIUM CHLORIDE AND POTASSIUM CHLORIDE SCH MLS/HR: 9; 1.49 INJECTION, SOLUTION INTRAVENOUS at 09:53

## 2020-09-07 RX ADMIN — SODIUM CHLORIDE SCH MLS/HR: 9 INJECTION, SOLUTION INTRAVENOUS at 20:41

## 2020-09-07 RX ADMIN — SODIUM CHLORIDE SCH MLS/HR: 0.9 INJECTION INTRAVENOUS at 23:00

## 2020-09-07 RX ADMIN — HEPARIN SODIUM SCH UNITS: 5000 INJECTION INTRAVENOUS; SUBCUTANEOUS at 08:26

## 2020-09-07 RX ADMIN — SODIUM CHLORIDE SCH MLS/HR: 0.9 INJECTION INTRAVENOUS at 05:13

## 2020-09-07 RX ADMIN — SODIUM CHLORIDE SCH MLS/HR: 9 INJECTION, SOLUTION INTRAVENOUS at 07:57

## 2020-09-07 RX ADMIN — SODIUM CHLORIDE SCH MLS/HR: 0.9 INJECTION INTRAVENOUS at 13:15

## 2020-09-07 RX ADMIN — FOLIC ACID SCH MLS/HR: 5 INJECTION, SOLUTION INTRAMUSCULAR; INTRAVENOUS; SUBCUTANEOUS at 09:08

## 2020-09-07 NOTE — NUR
NURSE NOTES:

Droplt and contact isolation. Patient is considered a PUI until the regular COVID swab, not 
the rapid COVID swab, coems back per MD Zaireable.

## 2020-09-07 NOTE — NUR
NURSE NOTES:

Pt is in bed, awake and alert. No acute distress noted. No fever, cough or SOB. Room air. Pt 
unable to ambulate still. Pt has a condom cath, periodically uses urinal. Wound dressing 
changed.

## 2020-09-07 NOTE — NUR
NURSE NOTES:

Received a pt awake,A&Ox4, and verbal. No sob,fever, and cough at the moment. Pt has 5/10 
pain but he don't want pain med. Iv is intact and asymptomatic. Bed is in the lowest 
position,locked, and call light within reach. We will keep monitoring the pt.

## 2020-09-07 NOTE — NUR
NURSE NOTES:

Received report from DA Hansen. Patient seen in bed, calm, eating breakfast, AAOx4, on bed 
rest. Patient is on room air, breathing is even and unlabored. Patient denies pain or SOB at 
this time. Patient stated that he has right sided weakness. IV site patent and intact. 
Patient has a urinal at bed side, RN instructed patient to use call light if in need of 
assistance. skin issues noted, isolation precaution maintained. Bed is locked and placed in 
lowest position with bed alarm on. Call light within reach. Will continue to monitor.

## 2020-09-07 NOTE — INFECTIOUS DISEASES PROG NOTE
Assessment/Plan


Abx:


IV Vancomycin 9/3-


IV amikacin 9/4-





Assessment:


COVID19 neg x1 (9/3 rapid PCR neg)





Sepsis vs SIRS- ?source of infection


r/o probable bacteremia, UTI, PNA


    UCx 9/3/20 - GPC < 10, 000


    BCx 9/3/20 - NGTD





High fever


Leukocytosis, worsening


   -u/a wbc 10-15, nit neg, leuk est neg


   -CXR  report pending (per ER interpretation: no consolidation, no effusion, 

no pneumothorax, no acute cardiopulmonary disease)


   -Bcx p





Alcohol withdrawal





HTN


homelessness


ETOH abuse





HIV (Neg)





Plan:


-Continue empiric IV Vancomycin #4  Cefepime #3





    - 9/4/20 SP Amikacin #1


-f/u wound cx


-Monitor CBC/CMP, temperatures


-2d echo, RPR


-COVID19 isolation and testing; repeat 2nd COVID PCR


-Cdiff if diarrhea





Thank  you for consulting [facility].  Will continue to follow along with 

you.





Subjective


Allergies:  


Coded Allergies:  


     No Known Allergies (Unverified , 11/18/15)


Afebrile


Leukocytosis resolving on Abx


Urine Cx GPC <10,000





Objective





Last 24 Hour Vital Signs








  Date Time  Temp Pulse Resp B/P (MAP) Pulse Ox O2 Delivery O2 Flow Rate FiO2


 


9/7/20 09:00      Room Air  


 


9/7/20 08:25  74  131/79    


 


9/7/20 08:25  74  131/79    


 


9/7/20 08:00 98.0 74 18 131/79 (96) 97   


 


9/7/20 04:00 98.2 72 18 128/80 (96) 96   


 


9/7/20 00:00 98.4 78 18 133/82 (99) 96   


 


9/6/20 21:00      Room Air  


 


9/6/20 20:00 98.2 72 18 139/82 (101) 96   


 


9/6/20 17:50  79  136/88    


 


9/6/20 16:00 98.8 79 18 136/88 (104) 95   


 


9/6/20 13:02  87  137/96    


 


9/6/20 11:55 98.8       


 


9/6/20 11:20 100.9 87 19 137/96 (110) 94   








Height (Feet):  5


Height (Inches):  7.00


Weight (Pounds):  160


GEN: NAD on RA


HEENT: NCAT, MMM, EOMI


Pulm: Equal chest rise and fall B/L, No accessory muscle use


ABD: Soft, ND


SKIN: Exposed skin with no rash, Normal in color





Microbiology








 Date/Time


Source Procedure


Growth Status


 


 


 9/4/20 10:50


Blood Blood Culture - Preliminary


NO GROWTH AFTER 48 HOURS Resulted


 


 9/4/20 10:40


Blood Blood Culture - Preliminary


NO GROWTH AFTER 48 HOURS Resulted





 9/4/20 16:00


Sputum Gram Stain


Pending Resulted


 


 9/4/20 16:00


Sputum Sputum Culture - Preliminary


NORMAL UPPER RESPIRATORY RONALD PRESENT Resulted


 


 9/5/20 00:00


Indwelling Cath Urine Culture - Preliminary


NO GROWTH AFTER 24 HOURS Resulted


 


 9/4/20 13:40


Urine,Clean Catch Urine Culture - Preliminary


NO GROWTH AFTER 24 HOURS Resulted





 9/4/20 15:00


Back Gram Stain - Final Resulted


 


 9/4/20 15:00 Wound Culture - Preliminary


Gram Positive Cocci Resulted











Laboratory Tests








Test


  9/6/20


11:15


 


Vancomycin Level Trough


  23.0 ug/mL


(5.0-12.0)  H











Current Medications








 Medications


  (Trade)  Dose


 Ordered  Sig/Yeison


 Route


 PRN Reason  Start Time


 Stop Time Status Last Admin


Dose Admin


 


 Acetaminophen


  (Tylenol)  650 mg  Q4H  PRN


 ORAL


 fever  9/4/20 16:51


 10/4/20 16:50  9/6/20 11:25


 


 


 Amlodipine


 Besylate


  (Norvasc)  10 mg  DAILY


 ORAL


   9/5/20 12:45


 10/5/20 12:44  9/7/20 08:25


 


 


 Cefepime HCl 1 gm/


 Dextrose  55 ml @ 


 110 mls/hr  Q8HR


 IVPB


   9/4/20 22:00


 9/11/20 14:26  9/7/20 05:13


 


 


 Chlordiazepoxide


  (Librium)  25 mg  Q6H  PRN


 ORAL


 ANXIETY  9/4/20 16:51


 9/11/20 16:50  9/6/20 08:48


 


 


 Chlordiazepoxide


  (Librium)  25 mg  Q6H  PRN


 ORAL


 TACHYCARDIA  9/4/20 16:51


 9/11/20 16:50   


 


 


 Chlordiazepoxide


  (Librium)  25 mg  Q6H  PRN


 ORAL


 Agitation  9/4/20 16:52


 9/11/20 16:51   


 


 


 Dextrose


  (Dextrose 50%)  25 ml  Q30M  PRN


 IV


 Hypoglycemia  9/4/20 16:52


 12/3/20 16:51   


 


 


 Dextrose


  (Dextrose 50%)  50 ml  Q30M  PRN


 IV


 Hypoglycemia  9/4/20 16:52


 12/3/20 16:51   


 


 


 Folic Acid 1 mg/


 Magnesium Sulfate


 2000 mg/


 Multivitamins 10


 ml/Potassium


 Chloride/Sodium


 Chloride  1,014.2 ml


  @ 124.876


 mls/hr  Q24H


 IV


   9/5/20 09:00


 10/4/20 08:59  9/7/20 09:53


 


 


 Heparin Sodium


  (Porcine)


  (Heparin 5000


 units/ml)  5,000 units  EVERY 12  HOURS


 SUBQ


   9/4/20 21:00


 10/19/20 08:59  9/7/20 08:26


 


 


 Labetalol HCl


  (Normodyne)  400 mg  TID


 ORAL


   9/5/20 13:00


 10/5/20 12:59  9/7/20 08:25


 


 


 Ondansetron HCl


  (Zofran)  4 mg  Q6H  PRN


 IVP


 Nausea & Vomiting  9/4/20 16:52


 10/4/20 16:51   


 


 


 Polyethylene


 Glycol


  (Miralax)  17 gm  HSPRN  PRN


 ORAL


 Constipation  9/4/20 16:52


 10/4/20 16:51   


 


 


 Thiamine HCl 100


 mg/Dextrose  56 ml @ 


 112 mls/hr  Q24H


 IVPB


   9/5/20 09:00


 10/4/20 08:59  9/7/20 09:08


 


 


 Vancomycin HCl


  ([facility]


 to dose)  1 ea  DAILY  PRN


 MISC


 Per rx protocol  9/5/20 09:00


 10/4/20 10:29   


 


 


 Vancomycin HCl


 750 mg/Sodium


 Chloride  275 ml @ 


 183.333


 mls/hr  Q12H


 IVPB


   9/6/20 20:00


 9/11/20 19:59  9/7/20 07:57


 


 


 Zolpidem Tartrate


  (Ambien)  5 mg  HSPRN  PRN


 ORAL


 Insomnia  9/4/20 16:52


 9/11/20 16:51  9/6/20 20:58


 

















Tyler Renner MD Sep 7, 2020 10:37

## 2020-09-07 NOTE — NUR
NURSE NOTES:

Patient received from DA Montes De Oca for re-assignment, awake, alert and oriented x 4, no SOB, 
bed in lowest position with breaks engaged and alarm on, denies any pain or discomfort at 
this time, IV line on right hand patent and intact, on room air, wound dressings intact, 
clean and dry, will continue to monitor and proceed with plan of care, call light within 
reach.

## 2020-09-07 NOTE — NUR
NURSE HAND-OFF: 



Important Events on Shift:[Pt is willing to go to a SNF or shelter when discharged]

Patient Status: [Stable]

Diet: []



Pending Orders: []

Pending Results/Labs:[]

Pending MD notification:[]



Latest Vital Signs: Temperature 98.2 , Pulse 72 , B/P 128 /80 , Respiratory Rate 18 , O2 SAT 
96 , Room Air, O2 Flow Rate 3.0 .  

Vital Sign Comment: []



Latest Brennan Fall Score: 60  

Fall Risk: High Risk 

Safety Measures: Call light Within Reach, Bed Alarm Zone 1, Side Rails Side Rails x3, Bed 
position Low and Locked.

Fall Precautions: 

Yellow Socks



Report given to [DA Montes De Oca].

## 2020-09-07 NOTE — NUR
NURSE HAND-OFF: 



Important Events on Shift:[]

Patient Status: []

Diet: []



Pending Orders: []

Pending Results/Labs:[]

Pending MD notification:[]



Latest Vital Signs: Temperature 97.7 , Pulse 81 , B/P 137 /86 , Respiratory Rate 19 , O2 SAT 
96 , Room Air, O2 Flow Rate 3.0 .  

Vital Sign Comment: []



Latest Brennan Fall Score: 60  

Fall Risk: High Risk 

Safety Measures: Call light Within Reach, Bed Alarm Zone 1, Side Rails Side Rails x3, Bed 
position Low and Locked.

Fall Precautions: 

Yellow Socks



Report given to [DA Evans].

## 2020-09-07 NOTE — INTERNAL MED PROGRESS NOTE
Subjective


Physician Name


Shakeel Marrufo


Attending Physician


Shakeel Marrufo MD





Current Medications








 Medications


  (Trade)  Dose


 Ordered  Sig/Yeison


 Route


 PRN Reason  Start Time


 Stop Time Status Last Admin


Dose Admin


 


 Acetaminophen


  (Tylenol)  650 mg  Q4H  PRN


 ORAL


 fever  9/4/20 16:51


 10/4/20 16:50  9/6/20 11:25


 


 


 Amlodipine


 Besylate


  (Norvasc)  10 mg  DAILY


 ORAL


   9/5/20 12:45


 10/5/20 12:44  9/7/20 08:25


 


 


 Cefepime HCl 1 gm/


 Dextrose  55 ml @ 


 110 mls/hr  Q8HR


 IVPB


   9/4/20 22:00


 9/11/20 14:26  9/7/20 13:15


 


 


 Chlordiazepoxide


  (Librium)  25 mg  Q6H  PRN


 ORAL


 ANXIETY  9/4/20 16:51


 9/11/20 16:50  9/6/20 08:48


 


 


 Chlordiazepoxide


  (Librium)  25 mg  Q6H  PRN


 ORAL


 TACHYCARDIA  9/4/20 16:51


 9/11/20 16:50   


 


 


 Chlordiazepoxide


  (Librium)  25 mg  Q6H  PRN


 ORAL


 Agitation  9/4/20 16:52


 9/11/20 16:51   


 


 


 Dextrose


  (Dextrose 50%)  25 ml  Q30M  PRN


 IV


 Hypoglycemia  9/4/20 16:52


 12/3/20 16:51   


 


 


 Dextrose


  (Dextrose 50%)  50 ml  Q30M  PRN


 IV


 Hypoglycemia  9/4/20 16:52


 12/3/20 16:51   


 


 


 Folic Acid 1 mg/


 Magnesium Sulfate


 2000 mg/


 Multivitamins 10


 ml/Potassium


 Chloride/Sodium


 Chloride  1,014.2 ml


  @ 124.876


 mls/hr  Q24H


 IV


   9/5/20 09:00


 10/4/20 08:59  9/7/20 09:53


 


 


 Heparin Sodium


  (Porcine)


  (Heparin 5000


 units/ml)  5,000 units  EVERY 12  HOURS


 SUBQ


   9/4/20 21:00


 10/19/20 08:59  9/7/20 08:26


 


 


 Labetalol HCl


  (Normodyne)  400 mg  TID


 ORAL


   9/5/20 13:00


 10/5/20 12:59  9/7/20 12:16


 


 


 Ondansetron HCl


  (Zofran)  4 mg  Q6H  PRN


 IVP


 Nausea & Vomiting  9/4/20 16:52


 10/4/20 16:51   


 


 


 Polyethylene


 Glycol


  (Miralax)  17 gm  HSPRN  PRN


 ORAL


 Constipation  9/4/20 16:52


 10/4/20 16:51   


 


 


 Thiamine HCl 100


 mg/Dextrose  56 ml @ 


 112 mls/hr  Q24H


 IVPB


   9/5/20 09:00


 10/4/20 08:59  9/7/20 09:08


 


 


 Vancomycin HCl


  ([facility]


 to dose)  1 ea  DAILY  PRN


 MISC


 Per rx protocol  9/5/20 09:00


 10/4/20 10:29   


 


 


 Vancomycin HCl


 750 mg/Sodium


 Chloride  275 ml @ 


 183.333


 mls/hr  Q12H


 IVPB


   9/6/20 20:00


 9/11/20 19:59  9/7/20 07:57


 


 


 Zolpidem Tartrate


  (Ambien)  5 mg  HSPRN  PRN


 ORAL


 Insomnia  9/4/20 16:52


 9/11/20 16:51  9/6/20 20:58


 








Allergies:  


Coded Allergies:  


     No Known Allergies (Unverified , 11/18/15)


Subjective


awake, alert, responsive, No CP or SOB, WBC: 11.2.





Objective





Last Vital Signs








  Date Time  Temp Pulse Resp B/P (MAP) Pulse Ox O2 Delivery O2 Flow Rate FiO2


 


9/7/20 12:16  78  130/84    


 


9/7/20 12:00 98.1  19  98   


 


9/7/20 09:00      Room Air  


 


9/4/20 12:00       3.0 











Microbiology








 Date/Time


Source Procedure


Growth Status


 


 


 9/4/20 16:00


Sputum Gram Stain - Final Complete


 


 9/4/20 16:00


Sputum Sputum Culture - Final Complete


 


 9/5/20 00:00


Indwelling Cath Urine Culture - Preliminary


NO GROWTH AFTER 24 HOURS Resulted





 9/4/20 15:00


Back Gram Stain - Final Resulted


 


 9/4/20 15:00 Wound Culture - Preliminary


Gram Positive Cocci Resulted

















Intake and Output  


 


 9/6/20 9/7/20





 19:00 07:00


 


Intake Total 1425.132 ml 785.000 ml


 


Output Total 1500 ml 1800 ml


 


Balance -74.868 ml -1015.000 ml


 


  


 


Intake Oral 240 ml 400 ml


 


IV Total 1185.132 ml 385.000 ml


 


Output Urine Total 1500 ml 1800 ml


 


# Bowel Movements 1 1








Objective


General: No acute distress, awake and alert


HEENT: NCAT, sclera anicteric, PERRL, EOMI.


Neck: Supple, no significant jugular venous distention, 


Lungs: Good inspiratory effort, no accessory muscle use, clear to auscultation 

bilaterally, no Wheeze or Rales.


Heart: Regular rate and rhythm, normal S1/S2, no murmurs.


Abdomen: soft, nontender, nondistended. Normoactive bowel sounds, mild obesity.


 / Rectal: Refused and deferred.


Extremities: No Cyanosis , clubbing or edema. 


Neuro: A&O x 3, Able to move all extremities


Skin: warm, no rashes or lesions


Psych: Normal mood and affect





Assessment/Plan


Assessment/Plan


ASSESSMENT:


1. Sepsis most likely due to UTI.


2. Leukocytosis with high fever.


3. Hypertension.


4. Alcoholism with alcohol withdrawal.


5. Homeless.





PLAN:  


in Medical Unit 


Abx: Vancomycin and Cefepime.  


Follow up with cultures, 2D echo, HIV test, RPR, and viral load.  


Dr. Kitchen consultation with Infectious Disease


Dr. Cook with Pulmonary/Critical Care.  


Code status: Full Code.  


DVT prophylaxis: Heparin subcu.


DC planning.











Shakeel Marrufo MD Sep 7, 2020 14:12

## 2020-09-08 VITALS — SYSTOLIC BLOOD PRESSURE: 140 MMHG | DIASTOLIC BLOOD PRESSURE: 83 MMHG

## 2020-09-08 VITALS — DIASTOLIC BLOOD PRESSURE: 92 MMHG | SYSTOLIC BLOOD PRESSURE: 145 MMHG

## 2020-09-08 VITALS — DIASTOLIC BLOOD PRESSURE: 97 MMHG | SYSTOLIC BLOOD PRESSURE: 143 MMHG

## 2020-09-08 VITALS — DIASTOLIC BLOOD PRESSURE: 74 MMHG | SYSTOLIC BLOOD PRESSURE: 132 MMHG

## 2020-09-08 VITALS — DIASTOLIC BLOOD PRESSURE: 95 MMHG | SYSTOLIC BLOOD PRESSURE: 157 MMHG

## 2020-09-08 VITALS — DIASTOLIC BLOOD PRESSURE: 87 MMHG | SYSTOLIC BLOOD PRESSURE: 119 MMHG

## 2020-09-08 RX ADMIN — SODIUM CHLORIDE SCH MLS/HR: 0.9 INJECTION INTRAVENOUS at 22:17

## 2020-09-08 RX ADMIN — HEPARIN SODIUM SCH UNITS: 5000 INJECTION INTRAVENOUS; SUBCUTANEOUS at 09:58

## 2020-09-08 RX ADMIN — SODIUM CHLORIDE SCH MLS/HR: 0.9 INJECTION INTRAVENOUS at 05:17

## 2020-09-08 RX ADMIN — CHLORDIAZEPOXIDE HYDROCHLORIDE PRN MG: 25 CAPSULE ORAL at 17:39

## 2020-09-08 RX ADMIN — SODIUM CHLORIDE SCH MLS/HR: 9 INJECTION, SOLUTION INTRAVENOUS at 20:28

## 2020-09-08 RX ADMIN — HEPARIN SODIUM SCH UNITS: 5000 INJECTION INTRAVENOUS; SUBCUTANEOUS at 20:36

## 2020-09-08 RX ADMIN — SODIUM CHLORIDE AND POTASSIUM CHLORIDE SCH MLS/HR: 9; 1.49 INJECTION, SOLUTION INTRAVENOUS at 11:21

## 2020-09-08 RX ADMIN — FOLIC ACID SCH MLS/HR: 5 INJECTION, SOLUTION INTRAMUSCULAR; INTRAVENOUS; SUBCUTANEOUS at 11:21

## 2020-09-08 RX ADMIN — CHLORDIAZEPOXIDE HYDROCHLORIDE PRN MG: 25 CAPSULE ORAL at 10:01

## 2020-09-08 RX ADMIN — SODIUM CHLORIDE SCH MLS/HR: 9 INJECTION, SOLUTION INTRAVENOUS at 11:20

## 2020-09-08 RX ADMIN — SODIUM CHLORIDE SCH MLS/HR: 0.9 INJECTION INTRAVENOUS at 13:53

## 2020-09-08 NOTE — PULMONOLOGY PROGRESS NOTE
Subjective


Allergies:  


Coded Allergies:  


     No Known Allergies (Unverified , 11/18/15)





Objective





Last 24 Hour Vital Signs








  Date Time  Temp Pulse Resp B/P (MAP) Pulse Ox O2 Delivery O2 Flow Rate FiO2


 


9/8/20 09:56  79  143/97    


 


9/8/20 09:56  79  143/97    


 


9/8/20 09:00      Room Air  


 


9/8/20 08:00 97.5 79 18 143/97 (112) 97   


 


9/8/20 04:00 96.8 78 18 145/92 (109) 96   


 


9/8/20 00:00 97.9 75 18 119/87 (98) 95   


 


9/7/20 21:00      Room Air  


 


9/7/20 20:00 98.1 74 18 140/96 (111) 96   


 


9/7/20 17:23  81  137/86    


 


9/7/20 16:00 97.7 81 19 137/86 (103) 96   


 


9/7/20 12:16  78  130/84    


 


9/7/20 12:00 98.1 78 19 130/84 (99) 98   

















Intake and Output  


 


 9/7/20 9/8/20





 19:00 07:00


 


Intake Total  800 ml


 


Output Total  3800 ml


 


Balance  -3000 ml


 


  


 


Intake Oral  800 ml


 


Output Urine Total  3800 ml








General Appearance:  WD/WN


HEENT:  normocephalic, anicteric


Respiratory:  chest wall non-tender, lungs clear


Cardiovascular:  normal peripheral pulses, normal rate


Abdomen:  normal bowel sounds, soft, non tender


Genitourinary:  normal external genitalia


Extremities:  no cyanosis


Skin:  no rash


Laboratory Tests


9/8/20 07:02: Vancomycin Level Trough 13.9H





Current Medications








 Medications


  (Trade)  Dose


 Ordered  Sig/Yeison


 Route


 PRN Reason  Start Time


 Stop Time Status Last Admin


Dose Admin


 


 Acetaminophen


  (Tylenol)  650 mg  Q4H  PRN


 ORAL


 fever  9/4/20 16:51


 10/4/20 16:50  9/6/20 11:25


 


 


 Amlodipine


 Besylate


  (Norvasc)  10 mg  DAILY


 ORAL


   9/5/20 12:45


 10/5/20 12:44  9/8/20 09:56


 


 


 Cefepime HCl 1 gm/


 Dextrose  55 ml @ 


 110 mls/hr  Q8HR


 IVPB


   9/4/20 22:00


 9/11/20 14:26  9/8/20 05:17


 


 


 Chlordiazepoxide


  (Librium)  25 mg  Q6H  PRN


 ORAL


 ANXIETY  9/4/20 16:51


 9/11/20 16:50  9/8/20 10:01


 


 


 Chlordiazepoxide


  (Librium)  25 mg  Q6H  PRN


 ORAL


 TACHYCARDIA  9/4/20 16:51


 9/11/20 16:50   


 


 


 Chlordiazepoxide


  (Librium)  25 mg  Q6H  PRN


 ORAL


 Agitation  9/4/20 16:52


 9/11/20 16:51   


 


 


 Dextrose


  (Dextrose 50%)  25 ml  Q30M  PRN


 IV


 Hypoglycemia  9/4/20 16:52


 12/3/20 16:51   


 


 


 Dextrose


  (Dextrose 50%)  50 ml  Q30M  PRN


 IV


 Hypoglycemia  9/4/20 16:52


 12/3/20 16:51   


 


 


 Folic Acid 1 mg/


 Magnesium Sulfate


 2000 mg/


 Multivitamins 10


 ml/Potassium


 Chloride/Sodium


 Chloride  1,014.2 ml


  @ 124.876


 mls/hr  Q24H


 IV


   9/5/20 09:00


 10/4/20 08:59  9/8/20 11:21


 


 


 Heparin Sodium


  (Porcine)


  (Heparin 5000


 units/ml)  5,000 units  EVERY 12  HOURS


 SUBQ


   9/4/20 21:00


 10/19/20 08:59  9/8/20 09:58


 


 


 Labetalol HCl


  (Normodyne)  400 mg  TID


 ORAL


   9/5/20 13:00


 10/5/20 12:59  9/8/20 09:56


 


 


 Ondansetron HCl


  (Zofran)  4 mg  Q6H  PRN


 IVP


 Nausea & Vomiting  9/4/20 16:52


 10/4/20 16:51   


 


 


 Polyethylene


 Glycol


  (Miralax)  17 gm  HSPRN  PRN


 ORAL


 Constipation  9/4/20 16:52


 10/4/20 16:51   


 


 


 Thiamine HCl 100


 mg/Dextrose  56 ml @ 


 112 mls/hr  Q24H


 IVPB


   9/5/20 09:00


 10/4/20 08:59  9/8/20 11:21


 


 


 Vancomycin HCl


  ([facility]


 to dose)  1 ea  DAILY  PRN


 MISC


 Per rx protocol  9/5/20 09:00


 10/4/20 10:29   


 


 


 Vancomycin HCl


 750 mg/Sodium


 Chloride  275 ml @ 


 183.333


 mls/hr  Q12H


 IVPB


   9/6/20 20:00


 9/11/20 19:59  9/8/20 11:20


 


 


 Zolpidem Tartrate


  (Ambien)  5 mg  HSPRN  PRN


 ORAL


 Insomnia  9/4/20 16:52


 9/11/20 16:51  9/6/20 20:58


 











Assessment/Plan


Problems:  


(1) Sepsis


(2) Alcohol withdrawal


(3) History of hypertension


(4) ETOH abuse


(5) Homelessness


Assessment/Plan


afebrile


continue abx


monitor electrolytes


pt ot


wound care











Jessy Cook MD Sep 8, 2020 11:53

## 2020-09-08 NOTE — NUR
NURSE NOTES:

Received patient in bed. Awake, A/O x4. On room air, respirations unlabored. Patient denies 
pain. Iv in the Left hand, site intact. Patient is a high fall risk d/t Brennan fall score of 
60. Patient placed in a room close to the nurse's station for safety and close monitoring. 
Yellow gown on, yellow socks on, bed at the lowest position, bed alarm on high sensitivity, 
call light within reach with return demonstration.

## 2020-09-08 NOTE — INTERNAL MED PROGRESS NOTE
Subjective


Date of Service:  Sep 8, 2020


Physician Name


Don Simmons


Attending Physician


Shakeel Marrufo MD





Current Medications








 Medications


  (Trade)  Dose


 Ordered  Sig/Yeison


 Route


 PRN Reason  Start Time


 Stop Time Status Last Admin


Dose Admin


 


 Acetaminophen


  (Tylenol)  650 mg  Q4H  PRN


 ORAL


 fever  9/4/20 16:51


 10/4/20 16:50  9/6/20 11:25


 


 


 Amlodipine


 Besylate


  (Norvasc)  10 mg  DAILY


 ORAL


   9/5/20 12:45


 10/5/20 12:44  9/8/20 09:56


 


 


 Cefepime HCl 1 gm/


 Dextrose  55 ml @ 


 110 mls/hr  Q8HR


 IVPB


   9/4/20 22:00


 9/11/20 14:26  9/8/20 13:53


 


 


 Chlordiazepoxide


  (Librium)  25 mg  Q6H  PRN


 ORAL


 ANXIETY  9/4/20 16:51


 9/11/20 16:50  9/8/20 17:39


 


 


 Chlordiazepoxide


  (Librium)  25 mg  Q6H  PRN


 ORAL


 TACHYCARDIA  9/4/20 16:51


 9/11/20 16:50   


 


 


 Chlordiazepoxide


  (Librium)  25 mg  Q6H  PRN


 ORAL


 Agitation  9/4/20 16:52


 9/11/20 16:51   


 


 


 Dextrose


  (Dextrose 50%)  25 ml  Q30M  PRN


 IV


 Hypoglycemia  9/4/20 16:52


 12/3/20 16:51   


 


 


 Dextrose


  (Dextrose 50%)  50 ml  Q30M  PRN


 IV


 Hypoglycemia  9/4/20 16:52


 12/3/20 16:51   


 


 


 Folic Acid 1 mg/


 Magnesium Sulfate


 2000 mg/


 Multivitamins 10


 ml/Potassium


 Chloride/Sodium


 Chloride  1,014.2 ml


  @ 124.876


 mls/hr  Q24H


 IV


   9/5/20 09:00


 10/4/20 08:59  9/8/20 11:21


 


 


 Gadobutrol


  (Gadavist)  7.5 mmol  NOW  PRN


 IV


 Radiology Procedure  9/8/20 16:45


 9/12/20 16:33   


 


 


 Heparin Sodium


  (Porcine)


  (Heparin 5000


 units/ml)  5,000 units  EVERY 12  HOURS


 SUBQ


   9/4/20 21:00


 10/19/20 08:59  9/8/20 09:58


 


 


 Labetalol HCl


  (Normodyne)  400 mg  TID


 ORAL


   9/5/20 13:00


 10/5/20 12:59  9/8/20 17:39


 


 


 Ondansetron HCl


  (Zofran)  4 mg  Q6H  PRN


 IVP


 Nausea & Vomiting  9/4/20 16:52


 10/4/20 16:51   


 


 


 Polyethylene


 Glycol


  (Miralax)  17 gm  HSPRN  PRN


 ORAL


 Constipation  9/4/20 16:52


 10/4/20 16:51   


 


 


 Thiamine HCl 100


 mg/Dextrose  56 ml @ 


 112 mls/hr  Q24H


 IVPB


   9/5/20 09:00


 10/4/20 08:59  9/8/20 11:21


 


 


 [facility]


  [facility]


 [facility]  1 ea  DAILY  PRN


 MISC


 Per rx protocol  9/5/20 09:00


 10/4/20 10:29   


 


 


 Vancomycin HCl


 750 mg/Sodium


 Chloride  275 ml @ 


 183.333


 mls/hr  Q12H


 IVPB


   9/6/20 20:00


 9/11/20 19:59  9/8/20 11:20


 


 


 Zolpidem Tartrate


  (Ambien)  5 mg  HSPRN  PRN


 ORAL


 Insomnia  9/4/20 16:52


 9/11/20 16:51  9/6/20 20:58


 








Allergies:  


Coded Allergies:  


     No Known Allergies (Unverified , 11/18/15)


ROS Limited/Unobtainable:  No


Constitutional:  Reports: no symptoms


HEENT:  Reports: no symptoms


Cardiovascular:  Reports: no symptoms


Respiratory:  Reports: cough


Gastrointestinal/Abdominal:  Reports: no symptoms


Genitourinary:  Reports: no symptoms


Neurologic/Psychiatric:  Reports: no symptoms


Subjective


56 YO M with history of alcohol abuse admitted with cough.  Now UTI.  Cover for 

Int Tony- Dr Marrufo





Objective





Last Vital Signs








  Date Time  Temp Pulse Resp B/P (MAP) Pulse Ox O2 Delivery O2 Flow Rate FiO2


 


9/8/20 17:39  69  132/74    


 


9/8/20 16:00 97.2  18  96   


 


9/8/20 09:00      Room Air  


 


9/4/20 12:00       3.0 











Laboratory Tests








Test


  9/8/20


07:02


 


Vancomycin Level Trough


  13.9 ug/mL


(5.0-12.0)  H

















Intake and Output  


 


 9/7/20 9/8/20





 19:00 07:00


 


Intake Total  800 ml


 


Output Total  3800 ml


 


Balance  -3000 ml


 


  


 


Intake Oral  800 ml


 


Output Urine Total  3800 ml








Objective


Objective


General: No acute distress, awake and alert


HEENT: NCAT, sclera anicteric, PERRL, EOMI.


Neck: Supple, no significant jugular venous distention, 


Lungs: Good inspiratory effort, no accessory muscle use, clear to auscultation 

bilaterally, no Wheeze or Rales.


Heart: Regular rate and rhythm, normal S1/S2, no murmurs.


Abdomen: soft, nontender, nondistended. Normoactive bowel sounds, mild obesity.


 / Rectal: Refused and deferred.


Extremities: No Cyanosis , clubbing or edema. 


Neuro: A&O x 3, Able to move all extremities


Skin: warm, no rashes or lesions


Psych: Normal mood and affect





Assessment/Plan


ASSESSMENT:


1. Sepsis most likely due to UTI.


2. Leukocytosis with high fever.


3. Hypertension.


4. Alcoholism with alcohol withdrawal.


5. Homeless.





PLAN:  


in Medical Unit 


Abx: Vancomycin and Cefepime.  


Follow up with cultures, 2D echo, HIV test, RPR, and viral load.  


Dr. Kitchen consultation with Infectious Disease


Dr. Cook with Pulmonary/Critical Care.  


Code status: Full Code.  


DVT prophylaxis: Heparin subcu.


DC planning.











Don Simmons MD Sep 8, 2020 18:57

## 2020-09-08 NOTE — NUR
NURSE NOTES:

Patient awake in bed, alert and oriented x4, on room air, no SOB noted. Patient had BM, will 
clean patient. No other complaints. With IV access on left arm. Encouraged to use call light 
for assistance. Bed in lowest, lock engaged and alarm on. Will continue plan of care.

## 2020-09-08 NOTE — NUR
NOTE



MESSAGE LEFT FOR DR MCGRATH IN RE TO DC PLAN. AWAITING CALL BACK. 

-------------------------------------------------------------------------------

Addendum: 09/08/20 at 1431 by SUSSY PENNINGTON LVN LVN

-------------------------------------------------------------------------------

DR MCGRATH GAVE TO/RB TO DC HOME. NOTED AND CARRIED OUT. ALYSON SERRANO INFORMED.

## 2020-09-08 NOTE — NUR
NURSE HAND-OFF: 



Important Events on Shift: na

Patient Status: stable

Diet: regular



Pending Orders: 

Pending Results/Labs:

Pending MD notification: 



Latest Vital Signs: Temperature 96.8 , Pulse 78 , B/P 145 /92 , Respiratory Rate 18 , O2 SAT 
96 , Room Air, O2 Flow Rate 3.0 .  

Vital Sign Comment: 



Latest Brennan Fall Score: 60  

Fall Risk: High Risk 

Safety Measures: Call light Within Reach, Bed Alarm Zone 1, Side Rails Side Rails x3, Bed 
position Low and Locked.

Fall Precautions: 

Yellow Socks



Report given to DA Franklin.

## 2020-09-08 NOTE — NUR
NURSE HAND-OFF: 



Important Events on Shift:[PATRICE pending and MRI brain pending, physical therapy]

Patient Status: [FULL CODE]

Diet: [regular]



Pending Orders: []

Pending Results/Labs:[]

Pending MD notification:[]



Latest Vital Signs: Temperature 97.2 , Pulse 69 , B/P 132 /74 , Respiratory Rate 18 , O2 SAT 
96 , Room Air, O2 Flow Rate 3.0 .  

Vital Sign Comment: []



Latest Brennan Fall Score: 60  

Fall Risk: High Risk 

Safety Measures: Call light Within Reach, Bed Alarm Zone 1, Side Rails Side Rails x3, Bed 
position Low and Locked.

Fall Precautions: 

Yellow Socks



Report given to [Paulina ROBERSON].

## 2020-09-08 NOTE — NUR
NURSE NOTES:

Dr. Kitchen contacted RN for new order of PATRICE for possible endocarditis, and to hold 
discharge order.

## 2020-09-08 NOTE — NUR
NURSE NOTES:

IV medications started late d/t unable to start new PIV. IV started on Left AC after 
multiple attempts.

## 2020-09-08 NOTE — INFECTIOUS DISEASES PROG NOTE
Assessment/Plan








Assessment:


COVID19 neg x2 (9/3 rapid PCR neg; 9/4 SARS-COV2 PCR neg)





r/o PRobable endocarditis


   -2d echo: possible discrete vegetation on MV





SIRS vs Sepsis


   9/4 ua wbc 60-80, nit neg, leuk +1; ucx neg


         sp cx p


    UCx 9/3/20 - GPC < 10, 000; u/a wbc 10-15, nit neg, leuk neg


   9/3 CXR: no acute process





High fever, SP


Leukocytosis, resolving


   -u/a wbc 10-15, nit neg, leuk est neg


   -CXR  report pending (per ER interpretation: no consolidation, no effusion, 

no pneumothorax, no acute cardiopulmonary disease)


   -Bcx p





Diphteroids bactremia- contaminant


   -9/3 Bcx 1/4 diphteroids; 9/4 Bcx NTD





Alcohol withdrawal


   -9/6 CT head:  Age-related changes with no acute intracranial findings.


 


Multiple skin abrasions


   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





HTN


homelessness


ETOH abuse





HIV ab screen (Neg)


RPR neg





Plan:


-On empiric IV Vancomycin #5 and  Cefepime #4


    - 9/4/20 SP Amikacin #1





-f/u wound cx


-Monitor CBC/CMP, temperatures


-COVID19 neg x2


-Cdiff if diarrhea


-PATRICE to eval for endocarditis based on 2d echo results





Thank  you for consulting [facility].  Will continue to follow along with 

you.





Subjective


Allergies:  


Coded Allergies:  


     No Known Allergies (Unverified , 11/18/15)


afebrile >48hrs


leukocytosis resolving


2d echo showed possible discrete vegetation on MV





Objective





Last 24 Hour Vital Signs








  Date Time  Temp Pulse Resp B/P (MAP) Pulse Ox O2 Delivery O2 Flow Rate FiO2


 


9/8/20 12:55  73  157/95    


 


9/8/20 12:00 97.7 73 20 157/95 (115) 98   


 


9/8/20 09:56  79  143/97    


 


9/8/20 09:56  79  143/97    


 


9/8/20 09:00      Room Air  


 


9/8/20 08:00 97.5 79 18 143/97 (112) 97   


 


9/8/20 04:00 96.8 78 18 145/92 (109) 96   


 


9/8/20 00:00 97.9 75 18 119/87 (98) 95   


 


9/7/20 21:00      Room Air  


 


9/7/20 20:00 98.1 74 18 140/96 (111) 96   


 


9/7/20 17:23  81  137/86    


 


9/7/20 16:00 97.7 81 19 137/86 (103) 96   








Height (Feet):  5


Height (Inches):  7.00


Weight (Pounds):  160


General Appearance:  WD/WN


HEENT:  normocephalic, anicteric


Respiratory:  chest wall non-tender, lungs clear


Cardiovascular:  normal peripheral pulses, normal rate


Abdomen:  normal bowel sounds, soft, non tender


Extremities:  no cyanosis


Skin:  no rash





Laboratory Tests








Test


  9/8/20


07:02


 


Vancomycin Level Trough


  13.9 ug/mL


(5.0-12.0)  H











Current Medications








 Medications


  (Trade)  Dose


 Ordered  Sig/Yeison


 Route


 PRN Reason  Start Time


 Stop Time Status Last Admin


Dose Admin


 


 Acetaminophen


  (Tylenol)  650 mg  Q4H  PRN


 ORAL


 fever  9/4/20 16:51


 10/4/20 16:50  9/6/20 11:25


 


 


 Amlodipine


 Besylate


  (Norvasc)  10 mg  DAILY


 ORAL


   9/5/20 12:45


 10/5/20 12:44  9/8/20 09:56


 


 


 Cefepime HCl 1 gm/


 Dextrose  55 ml @ 


 110 mls/hr  Q8HR


 IVPB


   9/4/20 22:00


 9/11/20 14:26  9/8/20 13:53


 


 


 Chlordiazepoxide


  (Librium)  25 mg  Q6H  PRN


 ORAL


 ANXIETY  9/4/20 16:51


 9/11/20 16:50  9/8/20 10:01


 


 


 Chlordiazepoxide


  (Librium)  25 mg  Q6H  PRN


 ORAL


 TACHYCARDIA  9/4/20 16:51


 9/11/20 16:50   


 


 


 Chlordiazepoxide


  (Librium)  25 mg  Q6H  PRN


 ORAL


 Agitation  9/4/20 16:52


 9/11/20 16:51   


 


 


 Dextrose


  (Dextrose 50%)  25 ml  Q30M  PRN


 IV


 Hypoglycemia  9/4/20 16:52


 12/3/20 16:51   


 


 


 Dextrose


  (Dextrose 50%)  50 ml  Q30M  PRN


 IV


 Hypoglycemia  9/4/20 16:52


 12/3/20 16:51   


 


 


 Folic Acid 1 mg/


 Magnesium Sulfate


 2000 mg/


 Multivitamins 10


 ml/Potassium


 Chloride/Sodium


 Chloride  1,014.2 ml


  @ 124.876


 mls/hr  Q24H


 IV


   9/5/20 09:00


 10/4/20 08:59  9/8/20 11:21


 


 


 Heparin Sodium


  (Porcine)


  (Heparin 5000


 units/ml)  5,000 units  EVERY 12  HOURS


 SUBQ


   9/4/20 21:00


 10/19/20 08:59  9/8/20 09:58


 


 


 Labetalol HCl


  (Normodyne)  400 mg  TID


 ORAL


   9/5/20 13:00


 10/5/20 12:59  9/8/20 12:55


 


 


 Ondansetron HCl


  (Zofran)  4 mg  Q6H  PRN


 IVP


 Nausea & Vomiting  9/4/20 16:52


 10/4/20 16:51   


 


 


 Polyethylene


 Glycol


  (Miralax)  17 gm  HSPRN  PRN


 ORAL


 Constipation  9/4/20 16:52


 10/4/20 16:51   


 


 


 Thiamine HCl 100


 mg/Dextrose  56 ml @ 


 112 mls/hr  Q24H


 IVPB


   9/5/20 09:00


 10/4/20 08:59  9/8/20 11:21


 


 


 Vancomycin HCl


  ([facility] pharmacy


 to dose)  1 ea  DAILY  PRN


 MISC


 Per rx protocol  9/5/20 09:00


 10/4/20 10:29   


 


 


 Vancomycin HCl


 750 mg/Sodium


 Chloride  275 ml @ 


 183.333


 mls/hr  Q12H


 IVPB


   9/6/20 20:00


 9/11/20 19:59  9/8/20 11:20


 


 


 Zolpidem Tartrate


  (Ambien)  5 mg  HSPRN  PRN


 ORAL


 Insomnia  9/4/20 16:52


 9/11/20 16:51  9/6/20 20:58


 

















Heidi Kitchen M.D. Sep 8, 2020 15:06

## 2020-09-08 NOTE — NUR
NOTE



CALL RECEIVED FROM CHRISTOS BERNARDO AT [employer] [phone]  EXT [phone]. CALLED TO REQUEST A VERBAL 
REPORT ON PATIENT STATUS. INFORMED THIS CM HER DC PLANNING TEAM IS WORKING ON COORDINATING 
OUTPATIENT F/U APPT WITH PCP AND WILL CALL BACK WITH OPT CARE INFO.

## 2020-09-08 NOTE — NUR
RD ASSESSMENT & RECOMMENDATIONS

SEE CARE ACTIVITY FOR COMPLETE ASSESSMENT



DAILY ESTIMATED NEEDS:

Needs based on Sepsis, 73kg  

25-35  kcals/kg 

9212-1384  total kcals

1.25-2  g protein/kg

  g total protein 

25-30  mL/kg

3996-9290  total fluid mLs



NUTRITION DIAGNOSIS:

Increased kcal and pro needs r/t wound healing as evidenced by pt w/

multiple areas of skin breakdown, including DTPI R Hip, partially opened

DTPI Sacrum,R and L Buttocks, partially opened wound with soft loose

necrotic cap R coccygeal, partial thickness Pressure injury.



CURRENT DIET: Regular    



PO DIET RECOMMENDATIONS:

LOW NA DIET  





ADDITIONAL RECOMMENDATIONS:

1) Wound care: add MAYA BID  

2) Obtain a standing scale wt 

3) Add high pro snacks in b/w meals 

    Ensure Enlive qdaily  

4) Monitor and record po intake

## 2020-09-09 VITALS — SYSTOLIC BLOOD PRESSURE: 110 MMHG | DIASTOLIC BLOOD PRESSURE: 80 MMHG

## 2020-09-09 VITALS — DIASTOLIC BLOOD PRESSURE: 97 MMHG | SYSTOLIC BLOOD PRESSURE: 133 MMHG

## 2020-09-09 VITALS — DIASTOLIC BLOOD PRESSURE: 74 MMHG | SYSTOLIC BLOOD PRESSURE: 136 MMHG

## 2020-09-09 VITALS — SYSTOLIC BLOOD PRESSURE: 128 MMHG | DIASTOLIC BLOOD PRESSURE: 85 MMHG

## 2020-09-09 VITALS — DIASTOLIC BLOOD PRESSURE: 77 MMHG | SYSTOLIC BLOOD PRESSURE: 132 MMHG

## 2020-09-09 VITALS — SYSTOLIC BLOOD PRESSURE: 130 MMHG | DIASTOLIC BLOOD PRESSURE: 97 MMHG

## 2020-09-09 LAB
ADD MANUAL DIFF: NO
ANION GAP SERPL CALC-SCNC: 6 MMOL/L (ref 5–15)
BASOPHILS NFR BLD AUTO: 1.3 % (ref 0–2)
BUN SERPL-MCNC: 15 MG/DL (ref 7–18)
CALCIUM SERPL-MCNC: 9.1 MG/DL (ref 8.5–10.1)
CHLORIDE SERPL-SCNC: 104 MMOL/L (ref 98–107)
CO2 SERPL-SCNC: 26 MMOL/L (ref 21–32)
CREAT SERPL-MCNC: 1.2 MG/DL (ref 0.55–1.3)
EOSINOPHIL NFR BLD AUTO: 1.4 % (ref 0–3)
ERYTHROCYTE [DISTWIDTH] IN BLOOD BY AUTOMATED COUNT: 14.3 % (ref 11.6–14.8)
HCT VFR BLD CALC: 35.6 % (ref 42–52)
HGB BLD-MCNC: 11.5 G/DL (ref 14.2–18)
LYMPHOCYTES NFR BLD AUTO: 19.8 % (ref 20–45)
MCV RBC AUTO: 94 FL (ref 80–99)
MONOCYTES NFR BLD AUTO: 6.4 % (ref 1–10)
NEUTROPHILS NFR BLD AUTO: 71.1 % (ref 45–75)
PLATELET # BLD: 382 K/UL (ref 150–450)
POTASSIUM SERPL-SCNC: 4.2 MMOL/L (ref 3.5–5.1)
RBC # BLD AUTO: 3.8 M/UL (ref 4.7–6.1)
SODIUM SERPL-SCNC: 136 MMOL/L (ref 136–145)
WBC # BLD AUTO: 8.1 K/UL (ref 4.8–10.8)

## 2020-09-09 RX ADMIN — SODIUM CHLORIDE AND POTASSIUM CHLORIDE SCH MLS/HR: 9; 1.49 INJECTION, SOLUTION INTRAVENOUS at 09:20

## 2020-09-09 RX ADMIN — HEPARIN SODIUM SCH UNITS: 5000 INJECTION INTRAVENOUS; SUBCUTANEOUS at 09:21

## 2020-09-09 RX ADMIN — SODIUM CHLORIDE SCH MLS/HR: 0.9 INJECTION INTRAVENOUS at 14:52

## 2020-09-09 RX ADMIN — FOLIC ACID SCH MLS/HR: 5 INJECTION, SOLUTION INTRAMUSCULAR; INTRAVENOUS; SUBCUTANEOUS at 09:20

## 2020-09-09 RX ADMIN — SODIUM CHLORIDE SCH MLS/HR: 0.9 INJECTION INTRAVENOUS at 05:13

## 2020-09-09 RX ADMIN — SODIUM CHLORIDE SCH MLS/HR: 9 INJECTION, SOLUTION INTRAVENOUS at 09:21

## 2020-09-09 RX ADMIN — HEPARIN SODIUM SCH UNITS: 5000 INJECTION INTRAVENOUS; SUBCUTANEOUS at 21:17

## 2020-09-09 RX ADMIN — CHLORDIAZEPOXIDE HYDROCHLORIDE PRN MG: 25 CAPSULE ORAL at 12:03

## 2020-09-09 RX ADMIN — SODIUM CHLORIDE SCH MLS/HR: 9 INJECTION, SOLUTION INTRAVENOUS at 21:13

## 2020-09-09 RX ADMIN — SODIUM CHLORIDE SCH MLS/HR: 0.9 INJECTION INTRAVENOUS at 21:16

## 2020-09-09 NOTE — NUR
NOTE



S/W OLIVA AT [facility] MG[facility] [phone] EXT [id number].  OLIVA INFORMED OF PENDING PATRICE SCHEDULED 
ON 9/15/20. OLIVA ADVISED TO SPEAK WITH PATIENT IF HE WOULD BE AGREEABLE TO SNF PLACEMENT. 
PATIENT COULD BE BROUGHT BACK TO [facility] FOR PATRICE AS SCHEDULED, PER KATHIA.

 

THIS CM S/W PATIENT AT BEDSIDE AND INFORMED OF INSURANCE CM RECOMMENDATION. PATIENT AGREES 
TO SNF PLACEMENT. 

CALL BACK  MADE OLIVA AT [facility]. NO ANSWER. FOLLOW UP NEEDED IN AM TO INFORM OLIVA OF 
PATIENTS AGREEMENT TO SNF. 

OLIVA WILL COORDINATE SNF PLACEMENT WITH CONTRACTED FACILITIES.

## 2020-09-09 NOTE — INTERNAL MED PROGRESS NOTE
Subjective


Date of Service:  Sep 9, 2020


Physician Name


Don Simmons


Attending Physician


Shakeel Marrufo MD





Current Medications








 Medications


  (Trade)  Dose


 Ordered  Sig/Yeison


 Route


 PRN Reason  Start Time


 Stop Time Status Last Admin


Dose Admin


 


 Acetaminophen


  (Tylenol)  650 mg  Q4H  PRN


 ORAL


 fever  9/4/20 16:51


 10/4/20 16:50  9/6/20 11:25


 


 


 Amlodipine


 Besylate


  (Norvasc)  10 mg  DAILY


 ORAL


   9/5/20 12:45


 10/5/20 12:44  9/9/20 09:21


 


 


 Cefepime HCl 1 gm/


 Dextrose  55 ml @ 


 110 mls/hr  Q8HR


 IVPB


   9/4/20 22:00


 9/11/20 14:26  9/9/20 05:13


 


 


 Chlordiazepoxide


  (Librium)  25 mg  Q6H  PRN


 ORAL


 ANXIETY  9/4/20 16:51


 9/11/20 16:50  9/8/20 17:39


 


 


 Chlordiazepoxide


  (Librium)  25 mg  Q6H  PRN


 ORAL


 TACHYCARDIA  9/4/20 16:51


 9/11/20 16:50   


 


 


 Chlordiazepoxide


  (Librium)  25 mg  Q6H  PRN


 ORAL


 Agitation  9/4/20 16:52


 9/11/20 16:51   


 


 


 Dextrose


  (Dextrose 50%)  25 ml  Q30M  PRN


 IV


 Hypoglycemia  9/4/20 16:52


 12/3/20 16:51   


 


 


 Dextrose


  (Dextrose 50%)  50 ml  Q30M  PRN


 IV


 Hypoglycemia  9/4/20 16:52


 12/3/20 16:51   


 


 


 Folic Acid 1 mg/


 Magnesium Sulfate


 2000 mg/


 Multivitamins 10


 ml/Potassium


 Chloride/Sodium


 Chloride  1,014.2 ml


  @ 124.876


 mls/hr  Q24H


 IV


   9/5/20 09:00


 10/4/20 08:59  9/9/20 09:20


 


 


 Gadobutrol


  (Gadavist)  7.5 mmol  NOW  PRN


 IV


 Radiology Procedure  9/8/20 16:45


 9/12/20 16:33   


 


 


 Heparin Sodium


  (Porcine)


  (Heparin 5000


 units/ml)  5,000 units  EVERY 12  HOURS


 SUBQ


   9/4/20 21:00


 10/19/20 08:59  9/9/20 09:21


 


 


 Labetalol HCl


  (Normodyne)  400 mg  TID


 ORAL


   9/5/20 13:00


 10/5/20 12:59  9/9/20 09:21


 


 


 Ondansetron HCl


  (Zofran)  4 mg  Q6H  PRN


 IVP


 Nausea & Vomiting  9/4/20 16:52


 10/4/20 16:51   


 


 


 Polyethylene


 Glycol


  (Miralax)  17 gm  HSPRN  PRN


 ORAL


 Constipation  9/4/20 16:52


 10/4/20 16:51   


 


 


 Thiamine HCl 100


 mg/Dextrose  56 ml @ 


 112 mls/hr  Q24H


 IVPB


   9/5/20 09:00


 10/4/20 08:59  9/9/20 09:20


 


 


 Vancomycin HCl


  ([facility] pharmacy


 to dose)  1 ea  DAILY  PRN


 MISC


 Per rx protocol  9/5/20 09:00


 10/4/20 10:29   


 


 


 Vancomycin HCl


 750 mg/Sodium


 Chloride  275 ml @ 


 183.333


 mls/hr  Q12H


 IVPB


   9/6/20 20:00


 9/11/20 19:59  9/9/20 09:21


 


 


 Zolpidem Tartrate


  (Ambien)  5 mg  HSPRN  PRN


 ORAL


 Insomnia  9/4/20 16:52


 9/11/20 16:51  9/6/20 20:58


 








Allergies:  


Coded Allergies:  


     No Known Allergies (Unverified , 11/18/15)


ROS Limited/Unobtainable:  No


Constitutional:  Reports: no symptoms


HEENT:  Reports: no symptoms


Cardiovascular:  Reports: no symptoms


Respiratory:  Reports: no symptoms


Gastrointestinal/Abdominal:  Reports: no symptoms


Genitourinary:  Reports: no symptoms


Neurologic/Psychiatric:  Reports: no symptoms


Subjective


56 YO M with history of alcohol abuse admitted with cough.  Now UTI.  Cover for 

Int Med- Dr Marrufo.  Await transesophageal echocardiogram.





Objective





Last Vital Signs








  Date Time  Temp Pulse Resp B/P (MAP) Pulse Ox O2 Delivery O2 Flow Rate FiO2


 


9/9/20 09:21  82  130/97    


 


9/9/20 09:00      Room Air  


 


9/9/20 08:00 98.4  18  98   


 


9/4/20 12:00       3.0 











Laboratory Tests








Test


  9/9/20


05:40


 


White Blood Count


  8.1 K/UL


(4.8-10.8)


 


Red Blood Count


  3.80 M/UL


(4.70-6.10)  L


 


Hemoglobin


  11.5 G/DL


(14.2-18.0)  L


 


Hematocrit


  35.6 %


(42.0-52.0)  L


 


Mean Corpuscular Volume 94 FL (80-99)  


 


Mean Corpuscular Hemoglobin


  30.3 PG


(27.0-31.0)


 


Mean Corpuscular Hemoglobin


Concent 32.3 G/DL


(32.0-36.0)


 


Red Cell Distribution Width


  14.3 %


(11.6-14.8)


 


Platelet Count


  382 K/UL


(150-450)


 


Mean Platelet Volume


  6.2 FL


(6.5-10.1)  L


 


Neutrophils (%) (Auto)


  71.1 %


(45.0-75.0)


 


Lymphocytes (%) (Auto)


  19.8 %


(20.0-45.0)  L


 


Monocytes (%) (Auto)


  6.4 %


(1.0-10.0)


 


Eosinophils (%) (Auto)


  1.4 %


(0.0-3.0)


 


Basophils (%) (Auto)


  1.3 %


(0.0-2.0)


 


Sodium Level


  136 MMOL/L


(136-145)


 


Potassium Level


  4.2 MMOL/L


(3.5-5.1)


 


Chloride Level


  104 MMOL/L


()


 


Carbon Dioxide Level


  26 MMOL/L


(21-32)


 


Anion Gap


  6 mmol/L


(5-15)


 


Blood Urea Nitrogen


  15 mg/dL


(7-18)


 


Creatinine


  1.2 MG/DL


(0.55-1.30)


 


Estimat Glomerular Filtration


Rate > 60 mL/min


(>60)


 


Glucose Level


  106 MG/DL


()


 


Calcium Level


  9.1 MG/DL


(8.5-10.1)

















Intake and Output  


 


 9/8/20 9/9/20





 19:00 07:00


 


Intake Total 1839.008 ml 385.000 ml


 


Output Total 2900 ml 


 


Balance -1060.992 ml 385.000 ml


 


  


 


Intake Oral 840 ml 


 


IV Total 999.008 ml 385.000 ml


 


Output Urine Total 2900 ml 








Objective


Objective


General: No acute distress, awake and alert


HEENT: NCAT, sclera anicteric, PERRL, EOMI.


Neck: Supple, no significant jugular venous distention, 


Lungs: Good inspiratory effort, no accessory muscle use, clear to auscultation 

bilaterally, no Wheeze or Rales.


Heart: Regular rate and rhythm, normal S1/S2, no murmurs.


Abdomen: soft, nontender, nondistended. Normoactive bowel sounds, mild obesity.


 / Rectal: Refused and deferred.


Extremities: No Cyanosis , clubbing or edema. 


Neuro: A&O x 3, Able to move all extremities


Skin: warm, no rashes or lesions


Psych: Normal mood and affect





Assessment/Plan


ASSESSMENT:


1. Sepsis most likely due to UTI.


2. Leukocytosis with high fever.


3. Hypertension.


4. Alcoholism with alcohol withdrawal.


5. Homeless.


6.  Possible endocarditis





PLAN:  


in Medical Unit 


Abx: Vancomycin and Cefepime.  


Follow up with cultures, 2D echo, HIV test, RPR, and viral load.  


Dr. Kitchen consultation with Infectious Disease


Dr. Cook with Pulmonary/Critical Care.  


Code status: Full Code.  


DVT prophylaxis: Heparin subcu.


Await transesophageal echocardiogram











Don Simmons MD Sep 9, 2020 11:10

## 2020-09-09 NOTE — PULMONOLOGY PROGRESS NOTE
Subjective


ROS Limited/Unobtainable:  No


Allergies:  


Coded Allergies:  


     No Known Allergies (Unverified , 11/18/15)





Objective





Last 24 Hour Vital Signs








  Date Time  Temp Pulse Resp B/P (MAP) Pulse Ox O2 Delivery O2 Flow Rate FiO2


 


9/9/20 17:19  71  133/97    


 


9/9/20 12:03  77  110/80    


 


9/9/20 12:00 97.9 77 18 110/80 (90) 100   


 


9/9/20 09:21  82  130/97    


 


9/9/20 09:21  82  130/97    


 


9/9/20 09:00      Room Air  


 


9/9/20 08:00 98.4 82 18 130/97 (108) 98   


 


9/9/20 04:00 98.0 60 18 136/74 (94) 99   


 


9/9/20 00:00 98.2 63 18 132/77 (95) 97   


 


9/8/20 21:00      Room Air  


 


9/8/20 20:00 98.6 68 19 140/83 (102) 98   

















Intake and Output  


 


 9/8/20 9/9/20





 19:00 07:00


 


Intake Total 1839.008 ml 385.000 ml


 


Output Total 2900 ml 


 


Balance -1060.992 ml 385.000 ml


 


  


 


Intake Oral 840 ml 


 


IV Total 999.008 ml 385.000 ml


 


Output Urine Total 2900 ml 








General Appearance:  WD/WN


HEENT:  normocephalic, anicteric


Respiratory:  chest wall non-tender, lungs clear


Cardiovascular:  normal peripheral pulses, normal rate


Abdomen:  normal bowel sounds, soft, non tender


Genitourinary:  normal external genitalia


Extremities:  no cyanosis


Skin:  no rash


Laboratory Tests


9/9/20 05:40: 


White Blood Count 8.1, Red Blood Count 3.80L, Hemoglobin 11.5L, Hematocrit 35.6L

, Mean Corpuscular Volume 94, Mean Corpuscular Hemoglobin 30.3, Mean 

Corpuscular Hemoglobin Concent 32.3, Red Cell Distribution Width 14.3, Platelet 

Count 382, Mean Platelet Volume 6.2L, Neutrophils (%) (Auto) 71.1, Lymphocytes (

%) (Auto) 19.8L, Monocytes (%) (Auto) 6.4, Eosinophils (%) (Auto) 1.4, 

Basophils (%) (Auto) 1.3, Sodium Level 136, Potassium Level 4.2, Chloride Level 

104, Carbon Dioxide Level 26, Anion Gap 6, Blood Urea Nitrogen 15, Creatinine 

1.2, Estimat Glomerular Filtration Rate > 60, Glucose Level 106, Calcium Level 

9.1





Current Medications








 Medications


  (Trade)  Dose


 Ordered  Sig/Yeison


 Route


 PRN Reason  Start Time


 Stop Time Status Last Admin


Dose Admin


 


 Acetaminophen


  (Tylenol)  650 mg  Q4H  PRN


 ORAL


 fever  9/4/20 16:51


 10/4/20 16:50  9/6/20 11:25


 


 


 Amlodipine


 Besylate


  (Norvasc)  10 mg  DAILY


 ORAL


   9/5/20 12:45


 10/5/20 12:44  9/9/20 09:21


 


 


 Cefepime HCl 1 gm/


 Dextrose  55 ml @ 


 110 mls/hr  Q8HR


 IVPB


   9/4/20 22:00


 9/11/20 14:26  9/9/20 14:52


 


 


 Chlordiazepoxide


  (Librium)  25 mg  Q6H  PRN


 ORAL


 ANXIETY  9/4/20 16:51


 9/11/20 16:50  9/9/20 12:03


 


 


 Chlordiazepoxide


  (Librium)  25 mg  Q6H  PRN


 ORAL


 TACHYCARDIA  9/4/20 16:51


 9/11/20 16:50   


 


 


 Chlordiazepoxide


  (Librium)  25 mg  Q6H  PRN


 ORAL


 Agitation  9/4/20 16:52


 9/11/20 16:51   


 


 


 Dextrose


  (Dextrose 50%)  25 ml  Q30M  PRN


 IV


 Hypoglycemia  9/4/20 16:52


 12/3/20 16:51   


 


 


 Dextrose


  (Dextrose 50%)  50 ml  Q30M  PRN


 IV


 Hypoglycemia  9/4/20 16:52


 12/3/20 16:51   


 


 


 Folic Acid 1 mg/


 Magnesium Sulfate


 2000 mg/


 Multivitamins 10


 ml/Potassium


 Chloride/Sodium


 Chloride  1,014.2 ml


  @ 124.876


 mls/hr  Q24H


 IV


   9/5/20 09:00


 10/4/20 08:59  9/9/20 09:20


 


 


 Gadobutrol


  (Gadavist)  7.5 mmol  NOW  PRN


 IV


 Radiology Procedure  9/8/20 16:45


 9/12/20 16:33   


 


 


 Heparin Sodium


  (Porcine)


  (Heparin 5000


 units/ml)  5,000 units  EVERY 12  HOURS


 SUBQ


   9/4/20 21:00


 10/19/20 08:59  9/9/20 09:21


 


 


 Labetalol HCl


  (Normodyne)  400 mg  TID


 ORAL


   9/5/20 13:00


 10/5/20 12:59  9/9/20 17:19


 


 


 Ondansetron HCl


  (Zofran)  4 mg  Q6H  PRN


 IVP


 Nausea & Vomiting  9/4/20 16:52


 10/4/20 16:51   


 


 


 Polyethylene


 Glycol


  (Miralax)  17 gm  HSPRN  PRN


 ORAL


 Constipation  9/4/20 16:52


 10/4/20 16:51   


 


 


 Thiamine HCl 100


 mg/Dextrose  56 ml @ 


 112 mls/hr  Q24H


 IVPB


   9/5/20 09:00


 10/4/20 08:59  9/9/20 09:20


 


 


 Vancomycin HCl


  ([facility] pharmacy


 to dose)  1 ea  DAILY  PRN


 MISC


 Per rx protocol  9/5/20 09:00


 10/4/20 10:29   


 


 


 Vancomycin HCl


 750 mg/Sodium


 Chloride  275 ml @ 


 183.333


 mls/hr  Q12H


 IVPB


   9/6/20 20:00


 9/11/20 19:59  9/9/20 09:21


 


 


 Zolpidem Tartrate


  (Ambien)  5 mg  HSPRN  PRN


 ORAL


 Insomnia  9/4/20 16:52


 9/11/20 16:51  9/6/20 20:58


 











Assessment/Plan


Problems:  


(1) Sepsis


(2) Alcohol withdrawal


(3) History of hypertension


(4) ETOH abuse


(5) Homelessness


Assessment/Plan


doing better


walking in the hallway


afebrile


continue abx


monitor electrolytes


pt ot


wound care











Jessy Cook MD Sep 9, 2020 17:47

## 2020-09-09 NOTE — NUR
NURSE NOTES:

Received call from cardiology. PATRICE to be done Tuesday Sep. 15th at 1200 with Dr. Atkinson 
per [place]. Consent form signed by patient already.

## 2020-09-09 NOTE — DIAGNOSTIC IMAGING REPORT
Indication: Right upper extremity weakness

 

Technique: sagittal T1 fast spin echo, axial T1 and T2 FLAIR PROPELLER, axial T2 FS

PROPELLER, T2* GRE, axial diffusion weighted images, post contrast axial and coronal

T1 FLAIR PROPELLER images. ADC and exponential ADC maps generated additional ectatic

resonance venogram series also performed

 

Comparison: Brain CT dated 9/6/2020

 

Findings: Exam is limited as patient could not lower head sufficiently to use the new

brain coil, per technologist. No abnormal areas of restricted diffusion to suggest

acute infarction. No acute hemorrhage or edema. No mass effect nor midline shift. No

abnormal contrast enhancement. There is age-related enlargement of the ventricles and

extra axial CSF spaces. Vascular flow voids are preserved. There is periventricular

deep white matter high T2 signal, consistent with chronic microvascular deep white

matter ischemic change.. Visualized orbits and sinuses are unremarkable. The major

veins and sinuses all appear to be patent..

 

Impression: Chronic and age-related changes

 

Negative for acute intracranial bleed, mass effect, infarct, or contrast enhancing

lesion.

 

No venous or dural sinus abnormality demonstrated

## 2020-09-09 NOTE — NUR
NURSE NOTES:

Received patient in bed. Awake, A/O x4. On room air, respirations unlabored. Patient denies 
pain. Iv in the Left AC, site intact. Patient is a high fall risk d/t Brennan fall score of 
60. Patient placed in a room close to the nurse's station for safety and close monitoring. 
Yellow gown on, yellow socks on, bed at the lowest position, bed alarm on high sensitivity, 
call light within reach with return demonstration.

## 2020-09-09 NOTE — CARDIOLOGY REPORT
--------------- APPROVED REPORT --------------





EXAM: Three-dimensional, Two-dimensional and M-mode echocardiogram with Doppler and color Doppler.



INDICATION

Aortic Valve Disease

Vegetation



M-Mode DIMENSIONS 

IVSd0.7 (0.7-1.1cm)Left Atrium (MM)2.2 (1.6-4.0cm)

LVDd4.3 (3.5-5.6cm)Aortic Root2.7 (2.0-3.7cm)

PWd0.7 (0.7-1.1cm)Aortic Cusp Exc.1.8 (1.5-2.0cm)

IVSs1.4 cmEPSS0.2 (>1.0cm)

LVDs2.5 (2.5-4.0cm)

PWs1.4 cm



<Conclusion>

Normal left ventricular chamber size, systolic function and wall motion.

Left ventricular ejection fraction estimated to be 65 %.

No evidence of left ventricular hypertrophy.

No evidence of pericardial effusion.

All other cardiac chamber sizes are within normal limits. 

Focal aortic valve sclerosis with adequate cusp excursion.

Thickened mitral valve leaflets with normal excursion.



Echo densities noted on mitral valve leaflets suggestive of vegetations .

SBE may not be excluded by transthoracic 2-D echo. Consider PATRICE if clinically indicated.



Mitral annulus and aortic root calcification.

Pulmonic valve not well visualized.

Normal tricuspid valve structure. 

IVC at normal size and collapsing with respiration.



A  color flow and spectral Doppler study was performed and revealed:

No aortic insufficiency.

Trace mitral regurgitation.

Mitral diastolic velocities suggest reduced left ventricular relaxation c/w mild diastolic 

dysfunction (Grade I). 

Trace tricuspid regurgitation.

Tricuspid  systolic velocities suggests peak right ventricular systolic pressure of 10 mmHg.

## 2020-09-09 NOTE — NUR
NURSE HAND-OFF: 



Important Events on Shift:Sacral wound care, 1 BM

Patient Status: [FULL CODE]

Diet: [regular]



Pending Orders: PATRICE pending and MRI brain pending, labs

Pending Results/Labs:bmp, cbc

Pending MD notification:



Latest Vital Signs: Temperature 97.2 , Pulse 69 , B/P 132 /74 , Respiratory Rate 18 , O2 SAT 
96 , Room Air, O2 Flow Rate 3.0 .  

Vital Sign Comment: 



Latest Brennan Fall Score: 60  

Fall Risk: High Risk 

Safety Measures: Call light Within Reach, Bed Alarm Zone 1, Side Rails Side Rails x3, Bed 
position Low and Locked.

Fall Precautions: 

Yellow Socks



Report given to DA Franklin

## 2020-09-09 NOTE — CARDIOLOGY REPORT
--------------- APPROVED REPORT --------------





EKG Measurement

Heart [id number]

[id number]

[id number]

[id number]

[id number]



<Conclusion>

Sinus tachycardia

Otherwise normal ECG

## 2020-09-09 NOTE — NUR
CASE MANAGEMENT:REVIEW



SI;SEPSIS. POSSIBLE ENDOCARDITIS~PATRICE SCHEDULED TO R/O

98.4   82   18   136/74   98% ON RA



IS;VANCOMYCIN IV Q12

LABETALOL PO TID

NORVASC PO QD

THIAMINE IV Q24

CEFEPIME IV Q8



****MED SURG STATUS

DCP;PATIENT IS HOMELESS



PLAN;

CONT IV ABX

PATRICE

DC WHEN STABLE

## 2020-09-09 NOTE — NUR
NURSE NOTES:

RECEIVED PATIENT FROM DA CANTU. PATIENT IS AWAKE, AAOX4, ON ROOM AIR, NO ACUTE 
DISTRESS NOTED. PATIENT DENIES PAIN, DENIES SOB. WOUND DRESSINGS INTACT AND DRY. PIV ON LEFT 
AC 20G INTACT, PATENT. BED IS LOCKED AND LOW, BED ALARMS ACTIVE, SIDE RAILS UP X2, AND CALL 
LIGHT IS WITHIN REACH. PATIENT IS FALL RISK D/T WEAKNESS. REINFORCED TEACHING ON FALL 
PREVENTION AND CALL LIGHT USE FOR ASSISTANCE. PATIENT VERBALIZED UNDERSTANDING. COMMUNICATED 
WITH STAFF TO PERFORM FREQUENT ROUNDING. YELLOW SOCKS, YELLOW GOWN, YELLOW ARMBAND AND DOOR 
SIGN IN PLACE. WILL CONTINUE TO MONITOR.

## 2020-09-09 NOTE — INFECTIOUS DISEASES PROG NOTE
Assessment/Plan








Assessment:


COVID19 neg x2 (9/3 rapid PCR neg; 9/4 SARS-COV2 PCR neg)





r/o PRobable endocarditis


has R side weakness- r/o stroke


   -2d echo: possible discrete vegetation on MV





SIRS vs Sepsis


   9/4 ua wbc 60-80, nit neg, leuk +1; ucx neg


         sp cx p


    UCx 9/3/20 - GPC < 10, 000; u/a wbc 10-15, nit neg, leuk neg


   9/3 CXR: no acute process





High fever, SP


Leukocytosis, resolved


   -u/a wbc 10-15, nit neg, leuk est neg


   -CXR  report pending (per ER interpretation: no consolidation, no effusion, 

no pneumothorax, no acute cardiopulmonary disease)


   -Bcx p





Diphteroids bactremia- ?contaminant


   -9/3 Bcx 1/4 diphteroids; 9/4 Bcx NTD





Alcohol withdrawal


   -9/6 CT head:  Age-related changes with no acute intracranial findings.


 


Multiple skin abrasions


   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





HTN


homelessness


ETOH abuse





HIV ab screen (Neg)


RPR neg





Plan:


-On empiric IV Vancomycin #6 and  Cefepime #5


    - 9/4/20 SP Amikacin #1





-f/u wound cx


-Monitor CBC/CMP, temperatures


-COVID19 neg x2


-Cdiff if diarrhea


-PATRICE to eval for endocarditis based on 2d echo results


-Brain MRI w/wo to eval for stroke, mass





Thank  you for consulting [facility].  Will continue to follow along with 

you.





Subjective


Allergies:  


Coded Allergies:  


     No Known Allergies (Unverified , 11/18/15)


afebrile >72hrs


leukocytosis resolved





Objective





Last 24 Hour Vital Signs








  Date Time  Temp Pulse Resp B/P (MAP) Pulse Ox O2 Delivery O2 Flow Rate FiO2


 


9/9/20 12:03  77  110/80    


 


9/9/20 12:00 97.9 77 18 110/80 (90) 100   


 


9/9/20 09:21  82  130/97    


 


9/9/20 09:21  82  130/97    


 


9/9/20 09:00      Room Air  


 


9/9/20 08:00 98.4 82 18 130/97 (108) 98   


 


9/9/20 04:00 98.0 60 18 136/74 (94) 99   


 


9/9/20 00:00 98.2 63 18 132/77 (95) 97   


 


9/8/20 21:00      Room Air  


 


9/8/20 20:00 98.6 68 19 140/83 (102) 98   


 


9/8/20 17:39  69  132/74    


 


9/8/20 16:00 97.2 69 18 132/74 (93) 96   








Height (Feet):  5


Height (Inches):  7.00


Weight (Pounds):  160


General Appearance:  WD/WN


HEENT:  normocephalic, anicteric


Respiratory:  chest wall non-tender, lungs clear


Cardiovascular:  normal peripheral pulses, normal rate


Abdomen:  normal bowel sounds, soft, non tender


Extremities:  no cyanosis


Skin:  no rash





Laboratory Tests








Test


  9/9/20


05:40


 


White Blood Count


  8.1 K/UL


(4.8-10.8)


 


Red Blood Count


  3.80 M/UL


(4.70-6.10)  L


 


Hemoglobin


  11.5 G/DL


(14.2-18.0)  L


 


Hematocrit


  35.6 %


(42.0-52.0)  L


 


Mean Corpuscular Volume 94 FL (80-99)  


 


Mean Corpuscular Hemoglobin


  30.3 PG


(27.0-31.0)


 


Mean Corpuscular Hemoglobin


Concent 32.3 G/DL


(32.0-36.0)


 


Red Cell Distribution Width


  14.3 %


(11.6-14.8)


 


Platelet Count


  382 K/UL


(150-450)


 


Mean Platelet Volume


  6.2 FL


(6.5-10.1)  L


 


Neutrophils (%) (Auto)


  71.1 %


(45.0-75.0)


 


Lymphocytes (%) (Auto)


  19.8 %


(20.0-45.0)  L


 


Monocytes (%) (Auto)


  6.4 %


(1.0-10.0)


 


Eosinophils (%) (Auto)


  1.4 %


(0.0-3.0)


 


Basophils (%) (Auto)


  1.3 %


(0.0-2.0)


 


Sodium Level


  136 MMOL/L


(136-145)


 


Potassium Level


  4.2 MMOL/L


(3.5-5.1)


 


Chloride Level


  104 MMOL/L


()


 


Carbon Dioxide Level


  26 MMOL/L


(21-32)


 


Anion Gap


  6 mmol/L


(5-15)


 


Blood Urea Nitrogen


  15 mg/dL


(7-18)


 


Creatinine


  1.2 MG/DL


(0.55-1.30)


 


Estimat Glomerular Filtration


Rate > 60 mL/min


(>60)


 


Glucose Level


  106 MG/DL


()


 


Calcium Level


  9.1 MG/DL


(8.5-10.1)











Current Medications








 Medications


  (Trade)  Dose


 Ordered  Sig/Yeison


 Route


 PRN Reason  Start Time


 Stop Time Status Last Admin


Dose Admin


 


 Acetaminophen


  (Tylenol)  650 mg  Q4H  PRN


 ORAL


 fever  9/4/20 16:51


 10/4/20 16:50  9/6/20 11:25


 


 


 Amlodipine


 Besylate


  (Norvasc)  10 mg  DAILY


 ORAL


   9/5/20 12:45


 10/5/20 12:44  9/9/20 09:21


 


 


 Cefepime HCl 1 gm/


 Dextrose  55 ml @ 


 110 mls/hr  Q8HR


 IVPB


   9/4/20 22:00


 9/11/20 14:26  9/9/20 05:13


 


 


 Chlordiazepoxide


  (Librium)  25 mg  Q6H  PRN


 ORAL


 ANXIETY  9/4/20 16:51


 9/11/20 16:50  9/9/20 12:03


 


 


 Chlordiazepoxide


  (Librium)  25 mg  Q6H  PRN


 ORAL


 TACHYCARDIA  9/4/20 16:51


 9/11/20 16:50   


 


 


 Chlordiazepoxide


  (Librium)  25 mg  Q6H  PRN


 ORAL


 Agitation  9/4/20 16:52


 9/11/20 16:51   


 


 


 Dextrose


  (Dextrose 50%)  25 ml  Q30M  PRN


 IV


 Hypoglycemia  9/4/20 16:52


 12/3/20 16:51   


 


 


 Dextrose


  (Dextrose 50%)  50 ml  Q30M  PRN


 IV


 Hypoglycemia  9/4/20 16:52


 12/3/20 16:51   


 


 


 Folic Acid 1 mg/


 Magnesium Sulfate


 2000 mg/


 Multivitamins 10


 ml/Potassium


 Chloride/Sodium


 Chloride  1,014.2 ml


  @ 124.876


 mls/hr  Q24H


 IV


   9/5/20 09:00


 10/4/20 08:59  9/9/20 09:20


 


 


 Gadobutrol


  (Gadavist)  7.5 mmol  NOW  PRN


 IV


 Radiology Procedure  9/8/20 16:45


 9/12/20 16:33   


 


 


 Heparin Sodium


  (Porcine)


  (Heparin 5000


 units/ml)  5,000 units  EVERY 12  HOURS


 SUBQ


   9/4/20 21:00


 10/19/20 08:59  9/9/20 09:21


 


 


 Labetalol HCl


  (Normodyne)  400 mg  TID


 ORAL


   9/5/20 13:00


 10/5/20 12:59  9/9/20 12:03


 


 


 Ondansetron HCl


  (Zofran)  4 mg  Q6H  PRN


 IVP


 Nausea & Vomiting  9/4/20 16:52


 10/4/20 16:51   


 


 


 Polyethylene


 Glycol


  (Miralax)  17 gm  HSPRN  PRN


 ORAL


 Constipation  9/4/20 16:52


 10/4/20 16:51   


 


 


 Thiamine HCl 100


 mg/Dextrose  56 ml @ 


 112 mls/hr  Q24H


 IVPB


   9/5/20 09:00


 10/4/20 08:59  9/9/20 09:20


 


 


 Vancomycin HCl


  ([facility] pharmacy


 to dose)  1 ea  DAILY  PRN


 MISC


 Per rx protocol  9/5/20 09:00


 10/4/20 10:29   


 


 


 Vancomycin HCl


 750 mg/Sodium


 Chloride  275 ml @ 


 183.333


 mls/hr  Q12H


 IVPB


   9/6/20 20:00


 9/11/20 19:59  9/9/20 09:21


 


 


 Zolpidem Tartrate


  (Ambien)  5 mg  HSPRN  PRN


 ORAL


 Insomnia  9/4/20 16:52


 9/11/20 16:51  9/6/20 20:58


 

















Heidi Kitchen M.D. Sep 9, 2020 14:44

## 2020-09-09 NOTE — NUR
NURSE NOTES:

Patient sent off the unit for MRI of the brain via wheelchair. On room air, respirations 
unlabored. Patient denies pain.

## 2020-09-09 NOTE — NUR
NURSE HAND-OFF: 



Important Events on Shift:[MRI brain, phys therapy]

Patient Status: [FULL CODE]

Diet: [regular]



Pending Orders: []

Pending Results/Labs:[]

Pending MD notification:[]



Latest Vital Signs: Temperature 98.2 , Pulse 71 , B/P 133 /97 , Respiratory Rate 17 , O2 SAT 
96 , Room Air, O2 Flow Rate 3.0 .  

Vital Sign Comment: []



Latest Brennan Fall Score: 45  

Fall Risk: High Risk 

Safety Measures: Call light Within Reach, Bed Alarm Zone 1, Side Rails Side Rails x2, Bed 
position Low and Locked.

Fall Precautions: 

Yellow Socks



Report given to [Thu RN].

## 2020-09-10 VITALS — SYSTOLIC BLOOD PRESSURE: 145 MMHG | DIASTOLIC BLOOD PRESSURE: 96 MMHG

## 2020-09-10 VITALS — SYSTOLIC BLOOD PRESSURE: 143 MMHG | DIASTOLIC BLOOD PRESSURE: 108 MMHG

## 2020-09-10 VITALS — SYSTOLIC BLOOD PRESSURE: 144 MMHG | DIASTOLIC BLOOD PRESSURE: 91 MMHG

## 2020-09-10 VITALS — DIASTOLIC BLOOD PRESSURE: 76 MMHG | SYSTOLIC BLOOD PRESSURE: 121 MMHG

## 2020-09-10 VITALS — DIASTOLIC BLOOD PRESSURE: 81 MMHG | SYSTOLIC BLOOD PRESSURE: 123 MMHG

## 2020-09-10 VITALS — SYSTOLIC BLOOD PRESSURE: 147 MMHG | DIASTOLIC BLOOD PRESSURE: 91 MMHG

## 2020-09-10 RX ADMIN — FOLIC ACID SCH MLS/HR: 5 INJECTION, SOLUTION INTRAMUSCULAR; INTRAVENOUS; SUBCUTANEOUS at 09:16

## 2020-09-10 RX ADMIN — SODIUM CHLORIDE SCH MLS/HR: 0.9 INJECTION INTRAVENOUS at 13:46

## 2020-09-10 RX ADMIN — VANCOMYCIN HYDROCHLORIDE SCH MLS/HR: 500 INJECTION, POWDER, LYOPHILIZED, FOR SOLUTION INTRAVENOUS at 22:12

## 2020-09-10 RX ADMIN — SODIUM CHLORIDE AND POTASSIUM CHLORIDE SCH MLS/HR: 9; 1.49 INJECTION, SOLUTION INTRAVENOUS at 09:16

## 2020-09-10 RX ADMIN — SODIUM CHLORIDE SCH MLS/HR: 0.9 INJECTION INTRAVENOUS at 06:36

## 2020-09-10 RX ADMIN — CHLORDIAZEPOXIDE HYDROCHLORIDE PRN MG: 25 CAPSULE ORAL at 08:20

## 2020-09-10 RX ADMIN — HEPARIN SODIUM SCH UNITS: 5000 INJECTION INTRAVENOUS; SUBCUTANEOUS at 08:24

## 2020-09-10 RX ADMIN — SODIUM CHLORIDE SCH MLS/HR: 0.9 INJECTION INTRAVENOUS at 21:26

## 2020-09-10 RX ADMIN — CHLORHEXIDINE GLUCONATE SCH APPLIC: 213 SOLUTION TOPICAL at 20:00

## 2020-09-10 RX ADMIN — HEPARIN SODIUM SCH UNITS: 5000 INJECTION INTRAVENOUS; SUBCUTANEOUS at 21:11

## 2020-09-10 RX ADMIN — VANCOMYCIN HYDROCHLORIDE SCH MLS/HR: 500 INJECTION, POWDER, LYOPHILIZED, FOR SOLUTION INTRAVENOUS at 11:33

## 2020-09-10 NOTE — CDS PHYSICIAN QUERY
Clarification is required for compliance, coding accuracy, and to reflect 
severity of illness for this patient



Dear Dr. Shakeel Marrufo M.D. Date: 09/10/2020

CDI/CDS Name: Dick Acevedo



Clinical Documentation Statement:

"55-year-old  gentleman with past medical history significant 
for hypertension,

homelessness, and alcohol abuse, who presented to the emergency department via 
ambulance

after he was found on the street with unsteady gait and tremors. " [ H& P Shakeel Marrufo M.D.09/04/20]

 ASSESSMENT:

Sepsis, possible due to COVID-19 infection versus bacteremia.

Alcoholism with alcohol withdrawal. Homeless.



NUTRITION DIAGNOSIS:

Increased kcal and pro needs r/t wound healing as evidenced by pt w/  multiple 
areas of skin breakdown,

including DTPI R Hip, partially opened DTPI Sacrum,R and L Buttocks, partially 
opened wound with soft loose

necrotic cap R coccygeal, partial thickness Pressure injury.  [Margie Sykes
, RD 9/8]



A diagnosis of "Pressure injury" has been identified in this patient. Please 
clarify the location and stage of the "Pressure injury"  for greater 
specificity.



Location of decubitus or pressure ulcer:     



[] Stage I (Non-blanching erythema of intact skin, the heralding lesion of skin 
ulceration, is present. In individuals with darker skin, discoloration, warmth, 
edema, induration, or hardness may be indicators.)

 

[] Stage II (There  is  partial  thickness  skin  loss  involving  epidermis,  
dermis,  or  both.   The  lesion   is superficial  and presents clinically as 
an abrasion, blister, or shallow center.)



[] Stage III (Full thickness skin loss involving damage or necrosis of 
subcutaneous tissue that may extend down to, but not through the underlying  
fascia exists. The  sore  presents  clinically  as  a deep crater with or 
without undermining of adjacent tissue.)



[] Stage IV (Full thickness skin loss with extensive destruction, tissue 
necrosis, or damage to muscle,

bone, or supporting structures is present.)



[] Unstageable



[x ] Finding no significant



[ ] Other:________________________





Present on Admission:  []  Yes          []  No         []  Clinically 
Undetermined





_________________                                    _____________

Physician signature                                       Date



Please also document in your Progress Notes and/or Discharge Summary and 
indicate if the condition was present on admission.

JERAMIE

## 2020-09-10 NOTE — NUR
NURSE HAND-OFF: 



Important Events on Shift: STABLE

Patient Status: STABLE

Diet: REGULAR



Pending Orders: N/A

Pending Results/Labs:N/A

Pending MD notification:N/A



Latest Vital Signs: Temperature 98.1 , Pulse 81 , B/P 144 /91 , Respiratory Rate 18 , O2 SAT 
97 , Room Air, O2 Flow Rate 3.0 .  

Vital Sign Comment: STABLE



Latest Brennan Fall Score: 45  

Fall Risk: High Risk 

Safety Measures: Call light Within Reach, Bed Alarm Zone 1, Side Rails Side Rails x2, Bed 
position Low and Locked.

Fall Precautions: 

Yellow Socks



Report given to DA BETHEA.

## 2020-09-10 NOTE — INFECTIOUS DISEASES PROG NOTE
Assessment/Plan








Assessment:


COVID19 neg x2 (9/3 rapid PCR neg; 9/4 SARS-COV2 PCR neg)





r/o PRobable endocarditis


has R side weakness- ?etiology - MRI brain neg for CVA


    -MRI Brain: Chronic and age-related changes. Negative for acute 

intracranial bleed, mass effect, infarct, or contrast enhancing lesion. No 

venous or dural sinus abnormality demonstrated 


   -2d echo: possible discrete vegetation on MV





SIRS vs Sepsis


   9/4 ua wbc 60-80, nit neg, leuk +1; ucx neg


         sp cx p


    UCx 9/3/20 - GPC < 10, 000; u/a wbc 10-15, nit neg, leuk neg


   9/3 CXR: no acute process





High fever, SP


Leukocytosis, resolved


   -u/a wbc 10-15, nit neg, leuk est neg


   -CXR  report pending (per ER interpretation: no consolidation, no effusion, 

no pneumothorax, no acute cardiopulmonary disease)


   -Bcx p





Diphteroids bactremia- ?contaminant


   -9/3 Bcx 1/4 diphteroids; 9/4 Bcx NTD; 9/8 Bcx NTD





Alcohol withdrawal


   -9/6 CT head:  Age-related changes with no acute intracranial findings.


 


Multiple skin abrasions


   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





HTN


homelessness


ETOH abuse





HIV ab screen (Neg)


RPR neg





Plan:


-On empiric IV Vancomycin #7 and  Cefepime #6- will continue until PATRICE is done


   -ok to dc to SNF from ID standpoint and continue above IV abx


    - 9/4/20 SP Amikacin #1





-f/u wound cx


-Monitor CBC/CMP, temperatures


-COVID19 neg x2


-Cdiff if diarrhea


-PATRICE to eval for endocarditis based on 2d echo results; scheduled for 9/15


-defer to primary further workup for LYNN cruz





Thank  you for consulting [facility].  Will continue to follow along with 

you.





Discussed with RN, Dr Marrufo, Dr Simmons, Dr Cook and 





Subjective


Allergies:  


Coded Allergies:  


     No Known Allergies (Unverified , 11/18/15)


afebrile


MRI brain no stroke


discharge planning





Objective





Last 24 Hour Vital Signs








  Date Time  Temp Pulse Resp B/P (MAP) Pulse Ox O2 Delivery O2 Flow Rate FiO2


 


9/10/20 13:45  70  123/81    


 


9/10/20 12:00 97.9 70 20 123/81 (95) 100   


 


9/10/20 09:00      Room Air  


 


9/10/20 08:21  84  143/108    


 


9/10/20 08:21  84  143/108    


 


9/10/20 08:00 98.1 84 20 143/108 (120) 97   


 


9/10/20 04:00 98.1 81 18 144/91 (108) 97   


 


9/10/20 00:00 97.7 75 18 145/96 (112) 98   


 


9/9/20 21:00      Room Air  


 


9/9/20 20:00 97.5 78 18 128/85 (99) 97   


 


9/9/20 17:19  71  133/97    


 


9/9/20 16:00 98.2 71 17 133/97 (109) 96   








Height (Feet):  5


Height (Inches):  7.00


Weight (Pounds):  160


General Appearance:  WD/WN


HEENT:  normocephalic, anicteric


Respiratory:  chest wall non-tender, lungs clear


Cardiovascular:  normal peripheral pulses, normal rate


Abdomen:  normal bowel sounds, soft, non tender


Extremities:  no cyanosis


Skin:  no rash





Microbiology








 Date/Time


Source Procedure


Growth Status


 


 


 9/8/20 18:45


Blood Blood Culture - Preliminary


NO GROWTH AFTER 24 HOURS Resulted


 


 9/8/20 18:30


Blood Blood Culture - Preliminary


NO GROWTH AFTER 24 HOURS Resulted











Laboratory Tests








Test


  9/10/20


06:55


 


Vancomycin Level Trough


  20.9 ug/mL


(5.0-12.0)  H











Current Medications








 Medications


  (Trade)  Dose


 Ordered  Sig/Yeison


 Route


 PRN Reason  Start Time


 Stop Time Status Last Admin


Dose Admin


 


 Acetaminophen


  (Tylenol)  650 mg  Q4H  PRN


 ORAL


 fever  9/4/20 16:51


 10/4/20 16:50  9/6/20 11:25


 


 


 Amlodipine


 Besylate


  (Norvasc)  10 mg  DAILY


 ORAL


   9/5/20 12:45


 10/5/20 12:44  9/10/20 08:21


 


 


 Cefepime HCl 1 gm/


 Dextrose  55 ml @ 


 110 mls/hr  Q8HR


 IVPB


   9/4/20 22:00


 9/11/20 14:26  9/10/20 13:46


 


 


 Chlordiazepoxide


  (Librium)  25 mg  Q6H  PRN


 ORAL


 ANXIETY  9/4/20 16:51


 9/11/20 16:50  9/10/20 08:20


 


 


 Chlordiazepoxide


  (Librium)  25 mg  Q6H  PRN


 ORAL


 TACHYCARDIA  9/4/20 16:51


 9/11/20 16:50   


 


 


 Chlordiazepoxide


  (Librium)  25 mg  Q6H  PRN


 ORAL


 Agitation  9/4/20 16:52


 9/11/20 16:51   


 


 


 Dextrose


  (Dextrose 50%)  25 ml  Q30M  PRN


 IV


 Hypoglycemia  9/4/20 16:52


 12/3/20 16:51   


 


 


 Dextrose


  (Dextrose 50%)  50 ml  Q30M  PRN


 IV


 Hypoglycemia  9/4/20 16:52


 12/3/20 16:51   


 


 


 Folic Acid 1 mg/


 Magnesium Sulfate


 2000 mg/


 Multivitamins 10


 ml/Potassium


 Chloride/Sodium


 Chloride  1,014.2 ml


  @ 124.876


 mls/hr  Q24H


 IV


   9/5/20 09:00


 10/4/20 08:59  9/10/20 09:16


 


 


 Gadobutrol


  (Gadavist)  7.5 mmol  NOW  PRN


 IV


 Radiology Procedure  9/8/20 16:45


 9/12/20 16:33   


 


 


 Heparin Sodium


  (Porcine)


  (Heparin 5000


 units/ml)  5,000 units  EVERY 12  HOURS


 SUBQ


   9/4/20 21:00


 10/19/20 08:59  9/10/20 08:24


 


 


 Labetalol HCl


  (Normodyne)  400 mg  TID


 ORAL


   9/5/20 13:00


 10/5/20 12:59  9/10/20 13:45


 


 


 Ondansetron HCl


  (Zofran)  4 mg  Q6H  PRN


 IVP


 Nausea & Vomiting  9/4/20 16:52


 10/4/20 16:51   


 


 


 Polyethylene


 Glycol


  (Miralax)  17 gm  HSPRN  PRN


 ORAL


 Constipation  9/4/20 16:52


 10/4/20 16:51   


 


 


 Thiamine HCl 100


 mg/Dextrose  56 ml @ 


 112 mls/hr  Q24H


 IVPB


   9/5/20 09:00


 10/4/20 08:59  9/10/20 09:16


 


 


 Vancomycin HCl


  ([facility] pharmacy


 to dose)  1 ea  DAILY  PRN


 MISC


 Per rx protocol  9/5/20 09:00


 10/4/20 10:29   


 


 


 Vancomycin HCl


 500 mg/Dextrose  110 ml @ 


 110 mls/hr  Q12HR@1000,2200


 IVPB


   9/10/20 10:00


 9/15/20 09:59  9/10/20 11:33


 


 


 Zolpidem Tartrate


  (Ambien)  5 mg  HSPRN  PRN


 ORAL


 Insomnia  9/4/20 16:52


 9/11/20 16:51  9/6/20 20:58


 

















Heidi Kitchen M.D. Sep 10, 2020 15:20

## 2020-09-10 NOTE — CDS PHYSICIAN QUERY
Clarification is required for compliance, coding accuracy, and to reflect 
severity of illness for this patient



Dear Dr. Shakeel Marrufo M.D. Date: 09/10/2020

CDI/CDS Name: Dick Acevedo



Clinical Documentation Statement:

"55-year-old  gentleman with past medical history significant 
for hypertension,

homelessness, and alcohol abuse, who presented to the emergency department via 
ambulance 

after he was found on the street with unsteady gait and tremors. " [ H& P Shakeel Marrufo M.D.09/04/20]

ASSESSMENT:

Sepsis, possible due to COVID-19 infection versus bacteremia.

Alcoholism with alcohol withdrawal. Homeless.



NUTRITION DIAGNOSIS:

Increased kcal and pro needs r/t wound healing as evidenced by pt w/

multiple areas of skin breakdown, including DTPI R Hip, partially opened

DTPI Sacrum,R and L Buttocks, partially opened wound with soft loose

necrotic cap R coccygeal, partial thickness Pressure injury.

[Margie Sykes, RD    9/8]



Clinical Finding Show:

BMI: 26.4 kg/m2

LAB (9/3) : Chem: Albumin 3.0 [3.4-5.0], Calcium 10.2 [ 8.5-10.1]

LAB (9/4) : Chem: Albumin 2.4 [3.4-5.0], Calcium 8.7 [ 8.5-10.1]



Medication: Dextrose 50ml IV 



Please select the most appropriate option:



[x] Protein/Calorie Malnutrition  

               [x] Mild       

               [] Moderate        

               [] Severe

[] Other _____________

[] Unable to determine

[] Not Applicable





Present on Admission:  []  Yes          []  No         []  Clinically 
Undetermined





_________________                                    _____________

Physician signature                                       Date



Please also document in your Progress Notes and/or Discharge Summary and 
indicate if the condition was present on admission.

MTDD

## 2020-09-10 NOTE — NUR
NURSE NOTES:

Received patient in no apparent distress. A&OX4. IV site patent and intact. Bed in lowest 
position. Call light within reach. Will continue to monitor.

## 2020-09-10 NOTE — NUR
NURSE NOTES:

Report received from Thu RN. Patient seen on rounds, AxOx4, up eating breakfast, not in 
distress, no complaints of pain at this time. PIV on right thumb patent and intact. Condom 
catheter secured and draining well. As per nurse, patient is able to ambulate with walker at 
bedside. Bed low and locked, siderails up x2, call light placed within reach and instructed 
to call nurse for assistance. Will continue to monitor.

## 2020-09-10 NOTE — NUR
DISCHARGE PLANNING 

CM S/W OLIVA

[facility] dc planner

T: [phone] ext [phone]

F: [phone]

CM INSTRUCTED TO FAX PT NOTES TO DC PLANNER FOR [facility]

FAX RECEIVED FROM CM TO [facility] SEEKING PLACEMENT FOR PATIENT TO SNF OR RECUPERATIVE CARE

## 2020-09-10 NOTE — NUR
NURSE HAND-OFF: 



Important Events on Shift: D/C pending SNF placement per ; for PICC placement 
tomorrow

Patient Status: Stable

Diet: Regular



Pending Orders: PICC insertion, Labs in AM

Pending Results/Labs:None

Pending MD notification:None



Latest Vital Signs: Temperature 98.0 , Pulse 73 , B/P 147 /91 , Respiratory Rate 20 , O2 SAT 
100 , Room Air, O2 Flow Rate 3.0 .  

Vital Sign Comment: 



Latest Brennan Fall Score: 45  

Fall Risk: High Risk 

Safety Measures: Call light Within Reach, Bed Alarm Zone 1, Side Rails Side Rails x2, Bed 
position Low and Locked.

Fall Precautions: 

Yellow Socks



Report given to April Perkins RN.

## 2020-09-10 NOTE — CDS PHYSICIAN QUERY
Clarification is required for compliance, coding accuracy, and to reflect 
severity of illness for this patient.



Dear Dr. Shakeel Marrufo M.D. Date: 09/10/2020

CDI/CDS Name: Dick Acevedo



Clinical Documentation Statement:

"55-year-old  gentleman with past medical history significant 
for hypertension,

homelessness, and alcohol abuse, who presented to the emergency department via 
ambulance 

after he was found on the street with unsteady gait and tremors. " [ H& P Shakeel Marrufo M.D.09/04/20]

ASSESSMENT:

Sepsis, possible due to COVID-19 infection versus bacteremia.

Alcoholism with alcohol withdrawal. Homeless.



NUTRITION DIAGNOSIS:

Increased kcal and pro needs r/t wound healing as evidenced by pt w/

multiple areas of skin breakdown, including DTPI R Hip, partially opened

DTPI Sacrum,R and L Buttocks, partially opened wound with soft loose

necrotic cap R coccygeal, partial thickness Pressure injury.

[Margie Sykes, RD    9/8]



Clinical Finding Show:



LAB (9/3) :  Calcium 10.2 [ 8.5-10.1]



Please Clarify the diagnosis associated with this finding:

[ ] Hypercalcemia

[x ] Finding no significant

[ ] Other:________________________



Diagnosis:                                                                     
                                     





Present on Admission:  []  Yes          []  No         [x]  Clinically 
Undetermined





_________________                                    _____________

Physician signature                                       Date



Please also document in your Progress Notes and/or Discharge Summary and 
indicate if the condition was present on admission.

JERAMIE

## 2020-09-10 NOTE — NUR
NURSE NOTES:

Pt signed consent for peripherally inserted catheter placement. Consent witnessed and placed 
in chart. Called radiology to inform them of PICC order, however no answer. Will endorse to 
next shift to follow up scheduling tomorrow.

## 2020-09-10 NOTE — INTERNAL MED PROGRESS NOTE
Subjective


Date of Service:  Sep 10, 2020


Physician Name


Don Simmons


Attending Physician


Shakeel Marrufo MD





Current Medications








 Medications


  (Trade)  Dose


 Ordered  Sig/Yeison


 Route


 PRN Reason  Start Time


 Stop Time Status Last Admin


Dose Admin


 


 Acetaminophen


  (Tylenol)  650 mg  Q4H  PRN


 ORAL


 fever  9/4/20 16:51


 10/4/20 16:50  9/6/20 11:25


 


 


 Amlodipine


 Besylate


  (Norvasc)  10 mg  DAILY


 ORAL


   9/5/20 12:45


 10/5/20 12:44  9/10/20 08:21


 


 


 Cefepime HCl 1 gm/


 Dextrose  55 ml @ 


 110 mls/hr  Q8HR


 IVPB


   9/4/20 22:00


 9/11/20 14:26  9/10/20 13:46


 


 


 Chlordiazepoxide


  (Librium)  25 mg  Q6H  PRN


 ORAL


 ANXIETY  9/4/20 16:51


 9/11/20 16:50  9/10/20 08:20


 


 


 Chlordiazepoxide


  (Librium)  25 mg  Q6H  PRN


 ORAL


 TACHYCARDIA  9/4/20 16:51


 9/11/20 16:50   


 


 


 Chlordiazepoxide


  (Librium)  25 mg  Q6H  PRN


 ORAL


 Agitation  9/4/20 16:52


 9/11/20 16:51   


 


 


 Chlorhexidine


 Gluconate


  (Alysha-Hex 2%)  1 applic  DAILY@2000


 TOPIC


   9/10/20 20:00


 12/9/20 19:59   


 


 


 Dextrose


  (Dextrose 50%)  25 ml  Q30M  PRN


 IV


 Hypoglycemia  9/4/20 16:52


 12/3/20 16:51   


 


 


 Dextrose


  (Dextrose 50%)  50 ml  Q30M  PRN


 IV


 Hypoglycemia  9/4/20 16:52


 12/3/20 16:51   


 


 


 Folic Acid 1 mg/


 Magnesium Sulfate


 2000 mg/


 Multivitamins 10


 ml/Potassium


 Chloride/Sodium


 Chloride  1,014.2 ml


  @ 124.876


 mls/hr  Q24H


 IV


   9/5/20 09:00


 10/4/20 08:59  9/10/20 09:16


 


 


 Gadobutrol


  (Gadavist)  7.5 mmol  NOW  PRN


 IV


 Radiology Procedure  9/8/20 16:45


 9/12/20 16:33   


 


 


 Heparin Sodium


  (Porcine)


  (Heparin 5000


 units/ml)  5,000 units  EVERY 12  HOURS


 SUBQ


   9/4/20 21:00


 10/19/20 08:59  9/10/20 08:24


 


 


 Labetalol HCl


  (Normodyne)  400 mg  TID


 ORAL


   9/5/20 13:00


 10/5/20 12:59  9/10/20 17:14


 


 


 Ondansetron HCl


  (Zofran)  4 mg  Q6H  PRN


 IVP


 Nausea & Vomiting  9/4/20 16:52


 10/4/20 16:51   


 


 


 Polyethylene


 Glycol


  (Miralax)  17 gm  HSPRN  PRN


 ORAL


 Constipation  9/4/20 16:52


 10/4/20 16:51   


 


 


 Thiamine HCl 100


 mg/Dextrose  56 ml @ 


 112 mls/hr  Q24H


 IVPB


   9/5/20 09:00


 10/4/20 08:59  9/10/20 09:16


 


 


 Vancomycin HCl


  (Vanco pharmacy


 to dose)  1 ea  DAILY  PRN


 MISC


 Per rx protocol  9/5/20 09:00


 10/4/20 10:29   


 


 


 Vancomycin HCl


 500 mg/Dextrose  110 ml @ 


 110 mls/hr  Q12HR@1000,2200


 IVPB


   9/10/20 10:00


 9/15/20 09:59  9/10/20 11:33


 


 


 Zolpidem Tartrate


  (Ambien)  5 mg  HSPRN  PRN


 ORAL


 Insomnia  9/4/20 16:52


 9/11/20 16:51  9/6/20 20:58


 








Allergies:  


Coded Allergies:  


     No Known Allergies (Unverified , 11/18/15)


ROS Limited/Unobtainable:  No


Constitutional:  Reports: no symptoms


HEENT:  Reports: no symptoms


Cardiovascular:  Reports: no symptoms


Respiratory:  Reports: no symptoms


Gastrointestinal/Abdominal:  Reports: no symptoms


Genitourinary:  Reports: no symptoms


Neurologic/Psychiatric:  Reports: no symptoms


Subjective


54 YO M with history of alcohol abuse admitted with cough.  Now UTI.  Cover for 

Int Med- Dr Marrufo.  Await transesophageal echocardiogram.  C/O right side 

weakness





Objective





Last Vital Signs








  Date Time  Temp Pulse Resp B/P (MAP) Pulse Ox O2 Delivery O2 Flow Rate FiO2


 


9/10/20 17:14  73  147/91    


 


9/10/20 16:00 98.0  20  100   


 


9/10/20 09:00      Room Air  


 


9/4/20 12:00       3.0 











Laboratory Tests








Test


  9/10/20


06:55


 


Vancomycin Level Trough


  20.9 ug/mL


(5.0-12.0)  H











Microbiology








 Date/Time


Source Procedure


Growth Status


 


 


 9/8/20 18:45


Blood Blood Culture - Preliminary


NO GROWTH AFTER 24 HOURS Resulted


 


 9/8/20 18:30


Blood Blood Culture - Preliminary


NO GROWTH AFTER 24 HOURS Resulted

















Intake and Output  


 


 9/9/20 9/10/20





 19:00 07:00


 


Intake Total 1423.884 ml 836.666 ml


 


Output Total 500 ml 1400 ml


 


Balance 923.884 ml -563.334 ml


 


  


 


Intake Oral 300 ml 


 


IV Total 1123.884 ml 476.666 ml


 


Other  360 ml


 


Output Urine Total 500 ml 1400 ml








Objective


Objective


General: No acute distress, awake and alert


HEENT: NCAT, sclera anicteric, PERRL, EOMI.


Neck: Supple, no significant jugular venous distention, 


Lungs: Good inspiratory effort, no accessory muscle use, clear to auscultation 

bilaterally, no Wheeze or Rales.


Heart: Regular rate and rhythm, normal S1/S2, no murmurs.


Abdomen: soft, nontender, nondistended. Normoactive bowel sounds, mild obesity.


 / Rectal: Refused and deferred.


Extremities: No Cyanosis , clubbing or edema. 


Neuro: A&O x 3, Able to move all extremities


Skin: warm, no rashes or lesions


Psych: Normal mood and affect





Assessment/Plan


ASSESSMENT:


1. Sepsis most likely due to UTI.


2. Leukocytosis with high fever.


3. Hypertension.


4. Alcoholism with alcohol withdrawal.


5. Homeless.


6.  Possible endocarditis


7.  Right side weakness





PLAN:  


in Medical Unit 


Abx: Vancomycin and Cefepime.  


Follow up with cultures, 2D echo, HIV test, RPR, and viral load.  


Dr. Kitchen consultation with Infectious Disease


Dr. Cook with Pulmonary/Critical Care.  


Code status: Full Code.  


DVT prophylaxis: Heparin subcu.


Await transesophageal echocardiogram


MRI brain=normal











Don Simmons MD Sep 10, 2020 18:53

## 2020-09-11 VITALS — SYSTOLIC BLOOD PRESSURE: 119 MMHG | DIASTOLIC BLOOD PRESSURE: 77 MMHG

## 2020-09-11 VITALS — SYSTOLIC BLOOD PRESSURE: 128 MMHG | DIASTOLIC BLOOD PRESSURE: 88 MMHG

## 2020-09-11 VITALS — SYSTOLIC BLOOD PRESSURE: 153 MMHG | DIASTOLIC BLOOD PRESSURE: 92 MMHG

## 2020-09-11 VITALS — SYSTOLIC BLOOD PRESSURE: 125 MMHG | DIASTOLIC BLOOD PRESSURE: 71 MMHG

## 2020-09-11 VITALS — DIASTOLIC BLOOD PRESSURE: 97 MMHG | SYSTOLIC BLOOD PRESSURE: 148 MMHG

## 2020-09-11 VITALS — SYSTOLIC BLOOD PRESSURE: 137 MMHG | DIASTOLIC BLOOD PRESSURE: 74 MMHG

## 2020-09-11 LAB
ADD MANUAL DIFF: NO
ANION GAP SERPL CALC-SCNC: 8 MMOL/L (ref 5–15)
BASOPHILS NFR BLD AUTO: 1.6 % (ref 0–2)
BUN SERPL-MCNC: 19 MG/DL (ref 7–18)
CALCIUM SERPL-MCNC: 9.6 MG/DL (ref 8.5–10.1)
CHLORIDE SERPL-SCNC: 105 MMOL/L (ref 98–107)
CO2 SERPL-SCNC: 25 MMOL/L (ref 21–32)
CREAT SERPL-MCNC: 1.2 MG/DL (ref 0.55–1.3)
EOSINOPHIL NFR BLD AUTO: 1.3 % (ref 0–3)
ERYTHROCYTE [DISTWIDTH] IN BLOOD BY AUTOMATED COUNT: 14.9 % (ref 11.6–14.8)
HCT VFR BLD CALC: 35.9 % (ref 42–52)
HGB BLD-MCNC: 11.5 G/DL (ref 14.2–18)
LYMPHOCYTES NFR BLD AUTO: 25.5 % (ref 20–45)
MCV RBC AUTO: 93 FL (ref 80–99)
MONOCYTES NFR BLD AUTO: 7 % (ref 1–10)
NEUTROPHILS NFR BLD AUTO: 64.6 % (ref 45–75)
PLATELET # BLD: 382 K/UL (ref 150–450)
POTASSIUM SERPL-SCNC: 4.4 MMOL/L (ref 3.5–5.1)
RBC # BLD AUTO: 3.84 M/UL (ref 4.7–6.1)
SODIUM SERPL-SCNC: 138 MMOL/L (ref 136–145)
WBC # BLD AUTO: 7.7 K/UL (ref 4.8–10.8)

## 2020-09-11 PROCEDURE — 02HV33Z INSERTION OF INFUSION DEVICE INTO SUPERIOR VENA CAVA, PERCUTANEOUS APPROACH: ICD-10-PCS

## 2020-09-11 RX ADMIN — FOLIC ACID SCH MLS/HR: 5 INJECTION, SOLUTION INTRAMUSCULAR; INTRAVENOUS; SUBCUTANEOUS at 08:10

## 2020-09-11 RX ADMIN — HEPARIN SODIUM SCH UNITS: 5000 INJECTION INTRAVENOUS; SUBCUTANEOUS at 08:08

## 2020-09-11 RX ADMIN — VANCOMYCIN HYDROCHLORIDE SCH MLS/HR: 500 INJECTION, POWDER, LYOPHILIZED, FOR SOLUTION INTRAVENOUS at 22:42

## 2020-09-11 RX ADMIN — CHLORHEXIDINE GLUCONATE SCH APPLIC: 213 SOLUTION TOPICAL at 21:17

## 2020-09-11 RX ADMIN — CHLORDIAZEPOXIDE HYDROCHLORIDE PRN MG: 25 CAPSULE ORAL at 08:13

## 2020-09-11 RX ADMIN — VANCOMYCIN HYDROCHLORIDE SCH MLS/HR: 500 INJECTION, POWDER, LYOPHILIZED, FOR SOLUTION INTRAVENOUS at 10:56

## 2020-09-11 RX ADMIN — SODIUM CHLORIDE SCH MLS/HR: 0.9 INJECTION INTRAVENOUS at 21:17

## 2020-09-11 RX ADMIN — HEPARIN SODIUM SCH UNITS: 5000 INJECTION INTRAVENOUS; SUBCUTANEOUS at 21:18

## 2020-09-11 RX ADMIN — SODIUM CHLORIDE SCH MLS/HR: 0.9 INJECTION INTRAVENOUS at 13:50

## 2020-09-11 RX ADMIN — SODIUM CHLORIDE SCH MLS/HR: 0.9 INJECTION INTRAVENOUS at 05:26

## 2020-09-11 RX ADMIN — SODIUM CHLORIDE AND POTASSIUM CHLORIDE SCH MLS/HR: 9; 1.49 INJECTION, SOLUTION INTRAVENOUS at 08:10

## 2020-09-11 NOTE — INFECTIOUS DISEASES PROG NOTE
Assessment/Plan








Assessment:


COVID19 neg x2 (9/3 rapid PCR neg; 9/4 SARS-COV2 PCR neg)





r/o PRobable endocarditis


has R side weakness- ?etiology - MRI brain neg for CVA


    -MRI Brain: Chronic and age-related changes. Negative for acute 

intracranial bleed, mass effect, infarct, or contrast enhancing lesion. No 

venous or dural sinus abnormality demonstrated 


   -2d echo: possible discrete vegetation on MV





SIRS vs Sepsis


   9/4 ua wbc 60-80, nit neg, leuk +1; ucx neg


         sp cx p


    UCx 9/3/20 - GPC < 10, 000; u/a wbc 10-15, nit neg, leuk neg


   9/3 CXR: no acute process





High fever, SP


Leukocytosis, resolved


   -u/a wbc 10-15, nit neg, leuk est neg


   -CXR  report pending (per ER interpretation: no consolidation, no effusion, 

no pneumothorax, no acute cardiopulmonary disease)








Diphteroids bactremia- ?contaminant


   -9/3 Bcx 1/4 diphteroids; 9/4 Bcx NTD; 9/8 Bcx NTD





Alcohol withdrawal


   -9/6 CT head:  Age-related changes with no acute intracranial findings.


 


Multiple skin abrasions


   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





HTN


homelessness


ETOH abuse





HIV ab screen (Neg)


RPR neg





Plan:


-On empiric IV Vancomycin #8 and  Cefepime #7- will continue until PATRICE is done


   -ok to dc to SNF from ID standpoint and continue above IV abx


    - 9/4/20 SP Amikacin #1





-f/u wound cx


-Monitor CBC/CMP, temperatures


-COVID19 neg x2


-Cdiff if diarrhea


-PATRICE to eval for endocarditis based on 2d echo results; scheduled for 9/15


-defer to primary further workup for RUE weakness; discussed with Dr Simmons





Thank  you for consulting [facility].  Will continue to follow along with 

you.





Discussed with RN





Subjective


Allergies:  


Coded Allergies:  


     No Known Allergies (Unverified , 11/18/15)


afebrile


pICC Line


awaiting SNF placement





Objective





Last 24 Hour Vital Signs








  Date Time  Temp Pulse Resp B/P (MAP) Pulse Ox O2 Delivery O2 Flow Rate FiO2


 


9/11/20 13:50  79  153/92    


 


9/11/20 12:00 97.7 83 18 125/71 (89) 100   


 


9/11/20 09:00      Room Air  


 


9/11/20 08:05  79  153/92    


 


9/11/20 08:05  79  153/92    


 


9/11/20 08:00 97.5 79 18 153/92 (112) 100   


 


9/11/20 04:00 97.5 70 18 148/97 (114) 98   


 


9/11/20 00:00 97.8 76 17 128/88 (101) 98   


 


9/10/20 21:00      Room Air  


 


9/10/20 20:00 97.7 75 16 121/76 (91) 98   


 


9/10/20 17:14  73  147/91    


 


9/10/20 16:00 98.0 73 20 147/91 (109) 100   








Height (Feet):  5


Height (Inches):  7.00


Weight (Pounds):  160


General Appearance:  WD/WN


HEENT:  normocephalic, anicteric


Respiratory:  chest wall non-tender, lungs clear


Cardiovascular:  normal peripheral pulses, normal rate


Abdomen:  normal bowel sounds, soft, non tender


Extremities:  no cyanosis


Skin:  no rash





Microbiology








 Date/Time


Source Procedure


Growth Status


 


 


 9/8/20 18:45


Blood Blood Culture - Preliminary


NO GROWTH AFTER 48 HOURS Resulted


 


 9/8/20 18:30


Blood Blood Culture - Preliminary


NO GROWTH AFTER 48 HOURS Resulted











Laboratory Tests








Test


  9/11/20


05:40 9/11/20


09:45


 


White Blood Count


  7.7 K/UL


(4.8-10.8) 


 


 


Red Blood Count


  3.84 M/UL


(4.70-6.10)  L 


 


 


Hemoglobin


  11.5 G/DL


(14.2-18.0)  L 


 


 


Hematocrit


  35.9 %


(42.0-52.0)  L 


 


 


Mean Corpuscular Volume 93 FL (80-99)   


 


Mean Corpuscular Hemoglobin


  30.0 PG


(27.0-31.0) 


 


 


Mean Corpuscular Hemoglobin


Concent 32.1 G/DL


(32.0-36.0) 


 


 


Red Cell Distribution Width


  14.9 %


(11.6-14.8)  H 


 


 


Platelet Count


  382 K/UL


(150-450) 


 


 


Mean Platelet Volume


  6.2 FL


(6.5-10.1)  L 


 


 


Neutrophils (%) (Auto)


  64.6 %


(45.0-75.0) 


 


 


Lymphocytes (%) (Auto)


  25.5 %


(20.0-45.0) 


 


 


Monocytes (%) (Auto)


  7.0 %


(1.0-10.0) 


 


 


Eosinophils (%) (Auto)


  1.3 %


(0.0-3.0) 


 


 


Basophils (%) (Auto)


  1.6 %


(0.0-2.0) 


 


 


Sodium Level


  138 MMOL/L


(136-145) 


 


 


Potassium Level


  4.4 MMOL/L


(3.5-5.1) 


 


 


Chloride Level


  105 MMOL/L


() 


 


 


Carbon Dioxide Level


  25 MMOL/L


(21-32) 


 


 


Anion Gap


  8 mmol/L


(5-15) 


 


 


Blood Urea Nitrogen


  19 mg/dL


(7-18)  H 


 


 


Creatinine


  1.2 MG/DL


(0.55-1.30) 


 


 


Estimat Glomerular Filtration


Rate > 60 mL/min


(>60) 


 


 


Glucose Level


  102 MG/DL


() 


 


 


Calcium Level


  9.6 MG/DL


(8.5-10.1) 


 


 


Vancomycin Level Trough


  


  14.1 ug/mL


(5.0-12.0)  H











Current Medications








 Medications


  (Trade)  Dose


 Ordered  Sig/Yeison


 Route


 PRN Reason  Start Time


 Stop Time Status Last Admin


Dose Admin


 


 Acetaminophen


  (Tylenol)  650 mg  Q4H  PRN


 ORAL


 fever  9/4/20 16:51


 10/4/20 16:50  9/6/20 11:25


 


 


 Amlodipine


 Besylate


  (Norvasc)  10 mg  DAILY


 ORAL


   9/5/20 12:45


 10/5/20 12:44  9/11/20 08:05


 


 


 Chlordiazepoxide


  (Librium)  25 mg  Q6H  PRN


 ORAL


 ANXIETY  9/4/20 16:51


 9/11/20 16:50  9/11/20 08:13


 


 


 Chlordiazepoxide


  (Librium)  25 mg  Q6H  PRN


 ORAL


 TACHYCARDIA  9/4/20 16:51


 9/11/20 16:50   


 


 


 Chlordiazepoxide


  (Librium)  25 mg  Q6H  PRN


 ORAL


 Agitation  9/4/20 16:52


 9/11/20 16:51   


 


 


 Chlorhexidine


 Gluconate


  (Alysha-Hex 2%)  1 applic  DAILY@2000


 TOPIC


   9/10/20 20:00


 12/9/20 19:59   


 


 


 Dextrose


  (Dextrose 50%)  25 ml  Q30M  PRN


 IV


 Hypoglycemia  9/4/20 16:52


 12/3/20 16:51   


 


 


 Dextrose


  (Dextrose 50%)  50 ml  Q30M  PRN


 IV


 Hypoglycemia  9/4/20 16:52


 12/3/20 16:51   


 


 


 Folic Acid 1 mg/


 Magnesium Sulfate


 2000 mg/


 Multivitamins 10


 ml/Potassium


 Chloride/Sodium


 Chloride  1,014.2 ml


  @ 124.876


 mls/hr  Q24H


 IV


   9/5/20 09:00


 10/4/20 08:59  9/11/20 08:10


 


 


 Gadobutrol


  (Gadavist)  7.5 mmol  NOW  PRN


 IV


 Radiology Procedure  9/8/20 16:45


 9/12/20 16:33   


 


 


 Heparin Sodium


  (Porcine)


  (Heparin 5000


 units/ml)  5,000 units  EVERY 12  HOURS


 SUBQ


   9/4/20 21:00


 10/19/20 08:59  9/10/20 21:11


 


 


 Heparin Sodium/


 Sodium Chloride


  (Heparin 1000


 units/500ml


 Premix)  1,000 unit  ONCE  PRN


 INJ


 PICC LINE  9/11/20 10:00


 9/13/20 23:59   


 


 


 Labetalol HCl


  (Normodyne)  400 mg  TID


 ORAL


   9/5/20 13:00


 10/5/20 12:59  9/11/20 13:50


 


 


 Lidocaine HCl


  (Xylocaine 1%


 30ml)  30 ml  ONCE  PRN


 INJ


 PICC LINE  9/11/20 10:00


 9/13/20 23:59   


 


 


 Ondansetron HCl


  (Zofran)  4 mg  Q6H  PRN


 IVP


 Nausea & Vomiting  9/4/20 16:52


 10/4/20 16:51   


 


 


 Polyethylene


 Glycol


  (Miralax)  17 gm  HSPRN  PRN


 ORAL


 Constipation  9/4/20 16:52


 10/4/20 16:51   


 


 


 Thiamine HCl 100


 mg/Dextrose  56 ml @ 


 112 mls/hr  Q24H


 IVPB


   9/5/20 09:00


 10/4/20 08:59  9/11/20 08:10


 


 


 Vancomycin HCl


  (Vanco pharmacy


 to dose)  1 ea  DAILY  PRN


 MISC


 Per rx protocol  9/5/20 09:00


 10/4/20 10:29   


 


 


 Vancomycin HCl


 500 mg/Dextrose  110 ml @ 


 110 mls/hr  Q12HR@1000,2200


 IVPB


   9/10/20 10:00


 9/15/20 09:59  9/11/20 10:56


 


 


 Zolpidem Tartrate


  (Ambien)  5 mg  HSPRN  PRN


 ORAL


 Insomnia  9/4/20 16:52


 9/11/20 16:51  9/6/20 20:58


 

















Heidi Kitchen M.D. Sep 11, 2020 15:05

## 2020-09-11 NOTE — NUR
NURSE NOTES:

Patient received in bed, awake, no distress at this time. PICC line intact, banana bag 
infusing as ordered. Call light within reach, will continue to monitor.

## 2020-09-11 NOTE — NUR
CASE MANAGEMENT:REVIEW



SI;SEPSIS. DIPHTEROIDS BACTEREMIA.

 98.0   76   20   147/91   98% ON RA

BUN 19   



IS;CEFEPIME IV Q8

VANCOMYCIN IV Q12

NORVASC PO QD

THIAMINE IV Q24



***MED SURG STATUS

DCP;PATIENT IS HOMELESS



PLAN;

DC TO RECUPERATIVE CARE

DC PENDING AUTH CONFIRMATION FROM [facility]

## 2020-09-11 NOTE — NUR
NURSE NOTES:

Report received from April Perkins RN. Patient seen on rounds, AxOx4, up eating breakfast, not 
in distress, no complaints of pain at this time. PIV on left antecubital patent and intact. 
Condom catheter secured and draining well. As per nurse, patient is able to ambulate with 
walker at bedside. Bed low and locked, siderails up x2, call light placed within reach and 
instructed to call nurse for assistance. Will continue to monitor.

## 2020-09-11 NOTE — INTERNAL MED PROGRESS NOTE
Subjective


Physician Name


Shakeel Marrufo


Attending Physician


Shakeel Marrufo MD





Current Medications








 Medications


  (Trade)  Dose


 Ordered  Sig/Eyison


 Route


 PRN Reason  Start Time


 Stop Time Status Last Admin


Dose Admin


 


 Acetaminophen


  (Tylenol)  650 mg  Q4H  PRN


 ORAL


 fever  9/4/20 16:51


 10/4/20 16:50  9/6/20 11:25


 


 


 Amlodipine


 Besylate


  (Norvasc)  10 mg  DAILY


 ORAL


   9/5/20 12:45


 10/5/20 12:44  9/11/20 08:05


 


 


 Cefepime HCl 1 gm/


 Dextrose  55 ml @ 


 110 mls/hr  EVERY 8  HOURS


 IVPB


   9/11/20 22:00


 9/18/20 21:59   


 


 


 Chlorhexidine


 Gluconate


  (Alysha-Hex 2%)  1 applic  DAILY@2000


 TOPIC


   9/10/20 20:00


 12/9/20 19:59   


 


 


 Dextrose


  (Dextrose 50%)  25 ml  Q30M  PRN


 IV


 Hypoglycemia  9/4/20 16:52


 12/3/20 16:51   


 


 


 Dextrose


  (Dextrose 50%)  50 ml  Q30M  PRN


 IV


 Hypoglycemia  9/4/20 16:52


 12/3/20 16:51   


 


 


 Folic Acid 1 mg/


 Magnesium Sulfate


 2000 mg/


 Multivitamins 10


 ml/Potassium


 Chloride/Sodium


 Chloride  1,014.2 ml


  @ 124.876


 mls/hr  Q24H


 IV


   9/5/20 09:00


 10/4/20 08:59  9/11/20 08:10


 


 


 Gadobutrol


  (Gadavist)  7.5 mmol  NOW  PRN


 IV


 Radiology Procedure  9/8/20 16:45


 9/12/20 16:33   


 


 


 Heparin Sodium


  (Porcine)


  (Heparin 5000


 units/ml)  5,000 units  EVERY 12  HOURS


 SUBQ


   9/4/20 21:00


 10/19/20 08:59  9/10/20 21:11


 


 


 Heparin Sodium/


 Sodium Chloride


  (Heparin 1000


 units/500ml


 Premix)  1,000 unit  ONCE  PRN


 INJ


 PICC LINE  9/11/20 10:00


 9/13/20 23:59   


 


 


 Labetalol HCl


  (Normodyne)  400 mg  TID


 ORAL


   9/5/20 13:00


 10/5/20 12:59  9/11/20 17:59


 


 


 Lidocaine HCl


  (Xylocaine 1%


 30ml)  30 ml  ONCE  PRN


 INJ


 PICC LINE  9/11/20 10:00


 9/13/20 23:59   


 


 


 Ondansetron HCl


  (Zofran)  4 mg  Q6H  PRN


 IVP


 Nausea & Vomiting  9/4/20 16:52


 10/4/20 16:51   


 


 


 Polyethylene


 Glycol


  (Miralax)  17 gm  HSPRN  PRN


 ORAL


 Constipation  9/4/20 16:52


 10/4/20 16:51   


 


 


 Thiamine HCl 100


 mg/Dextrose  56 ml @ 


 112 mls/hr  Q24H


 IVPB


   9/5/20 09:00


 10/4/20 08:59  9/11/20 08:10


 


 


 Vancomycin HCl


  (Vanco pharmacy


 to dose)  1 ea  DAILY  PRN


 MISC


 Per rx protocol  9/5/20 09:00


 10/4/20 10:29   


 


 


 Vancomycin HCl


 500 mg/Dextrose  110 ml @ 


 110 mls/hr  Q12HR@1000,2200


 IVPB


   9/10/20 10:00


 9/15/20 09:59  9/11/20 10:56


 








Allergies:  


Coded Allergies:  


     No Known Allergies (Unverified , 11/18/15)


Subjective


awake, alert, responsive, No CP or SOB, ambulating without any problem. WBC: 

7.7.





Objective





Last Vital Signs








  Date Time  Temp Pulse Resp B/P (MAP) Pulse Ox O2 Delivery O2 Flow Rate FiO2


 


9/11/20 17:59  75  137/74    


 


9/11/20 16:00 97.2  18  100   


 


9/11/20 09:00      Room Air  


 


9/4/20 12:00       3.0 











Laboratory Tests








Test


  9/11/20


05:40 9/11/20


09:45


 


White Blood Count


  7.7 K/UL


(4.8-10.8) 


 


 


Red Blood Count


  3.84 M/UL


(4.70-6.10)  L 


 


 


Hemoglobin


  11.5 G/DL


(14.2-18.0)  L 


 


 


Hematocrit


  35.9 %


(42.0-52.0)  L 


 


 


Mean Corpuscular Volume 93 FL (80-99)   


 


Mean Corpuscular Hemoglobin


  30.0 PG


(27.0-31.0) 


 


 


Mean Corpuscular Hemoglobin


Concent 32.1 G/DL


(32.0-36.0) 


 


 


Red Cell Distribution Width


  14.9 %


(11.6-14.8)  H 


 


 


Platelet Count


  382 K/UL


(150-450) 


 


 


Mean Platelet Volume


  6.2 FL


(6.5-10.1)  L 


 


 


Neutrophils (%) (Auto)


  64.6 %


(45.0-75.0) 


 


 


Lymphocytes (%) (Auto)


  25.5 %


(20.0-45.0) 


 


 


Monocytes (%) (Auto)


  7.0 %


(1.0-10.0) 


 


 


Eosinophils (%) (Auto)


  1.3 %


(0.0-3.0) 


 


 


Basophils (%) (Auto)


  1.6 %


(0.0-2.0) 


 


 


Sodium Level


  138 MMOL/L


(136-145) 


 


 


Potassium Level


  4.4 MMOL/L


(3.5-5.1) 


 


 


Chloride Level


  105 MMOL/L


() 


 


 


Carbon Dioxide Level


  25 MMOL/L


(21-32) 


 


 


Anion Gap


  8 mmol/L


(5-15) 


 


 


Blood Urea Nitrogen


  19 mg/dL


(7-18)  H 


 


 


Creatinine


  1.2 MG/DL


(0.55-1.30) 


 


 


Estimat Glomerular Filtration


Rate > 60 mL/min


(>60) 


 


 


Glucose Level


  102 MG/DL


() 


 


 


Calcium Level


  9.6 MG/DL


(8.5-10.1) 


 


 


Vancomycin Level Trough


  


  14.1 ug/mL


(5.0-12.0)  H

















Intake and Output  


 


 9/10/20 9/11/20





 19:00 07:00


 


Intake Total 1219.4 ml 700 ml


 


Output Total  2200 ml


 


Balance 1219.4 ml -1500 ml


 


  


 


Intake Oral  480 ml


 


IV Total 1219.4 ml 220 ml


 


Output Urine Total  2200 ml








Objective


General: No acute distress, awake and alert


HEENT: NCAT, sclera anicteric, PERRL, EOMI.


Neck: Supple, no significant jugular venous distention, 


Lungs: Good inspiratory effort, no accessory muscle use, clear to auscultation 

bilaterally, no Wheeze or Rales.


Heart: Regular rate and rhythm, normal S1/S2, no murmurs.


Abdomen: soft, nontender, nondistended. Normoactive bowel sounds, mild obesity.


 / Rectal: Refused and deferred.


Extremities: No Cyanosis , clubbing or edema. 


Neuro: A&O x 3, Able to move all extremities


Skin: warm, no rashes or lesions


Psych: Normal mood and affect





Assessment/Plan


Assessment/Plan


ASSESSMENT:


1. Sepsis most likely due to UTI.


2. Leukocytosis with high fever.


3. Hypertension.


4. Alcoholism with alcohol withdrawal.


5. Homeless.


6. Probable endocarditis


7. R side weakness- ?etiology - MRI brain neg for CVA


    -MRI Brain: Chronic and age-related changes. Negative for acute 

intracranial bleed, mass effect, infarct, or contrast enhancing lesion. No 

venous or dural sinus abnormality demonstrated 


8. SIRS vs Sepsis








PLAN:  


in Medical Unit 


Abx: Vancomycin and Cefepime.  


Follow up with cultures.


Dr. Kitchen consultation with Infectious Disease


Dr. Cook with Pulmonary/Critical Care.  


Code status: Full Code.  


DVT prophylaxis: Heparin subcu.


DC planning to SNF. 


waiting for PATRICE on 9/15.





2D Echo:


Normal left ventricular chamber size, systolic function and wall motion.


Left ventricular ejection fraction estimated to be 65 %.


No evidence of left ventricular hypertrophy.


No evidence of pericardial effusion.


All other cardiac chamber sizes are within normal limits. 


Focal aortic valve sclerosis with adequate cusp excursion.


Thickened mitral valve leaflets with normal excursion.





Echo densities noted on mitral valve leaflets suggestive of vegetations .


SBE may not be excluded by transthoracic 2-D echo. Consider PATRICE if clinically 

indicated.





Mitral annulus and aortic root calcification.


Pulmonic valve not well visualized.


Normal tricuspid valve structure. 


IVC at normal size and collapsing with respiration.





A  color flow and spectral Doppler study was performed and revealed:


No aortic insufficiency.


Trace mitral regurgitation.


Mitral diastolic velocities suggest reduced left ventricular relaxation c/w 

mild diastolic 


dysfunction (Grade I). 


Trace tricuspid regurgitation.


Tricuspid  systolic velocities suggests peak right ventricular systolic 

pressure of 10 mmHg.











Shakeel Marrufo MD Sep 11, 2020 19:40

## 2020-09-11 NOTE — NUR
NOTE:DISCHARGE PLANNING



FOLLOW UP CALL MADE TO [place] 

NOY SHER 

T: [phone]  EXT: [phone]

ACTIVELY SEEKING PLACEMENT FOR [facility] VS RECUPERATIVE CARE 

PER SEN, SHE WILL CALL BACK WITH UPDATE

## 2020-09-11 NOTE — DIAGNOSTIC IMAGING REPORT
Indications: Needs long-term IV access

 

Technique: Ultrasound confirms patent compressible left basilic vein. Total sterile

technique, including  sterile probe cover and sterile gel, hat, mask, sterile gown,

large sterile drape, and preparation with 2% chlorhexidine utilized. Local anesthesia

with 1% lidocaine. Under real-time ultrasound guidance, puncture basilic vein using

21-gauge needle, documented and archived, passage 0.018 guidewire under direct

fluoroscopy, which was used to determine appropriate catheter length, exchange for 4

Tr[place]id[place] peel-away sheath. 4 Trin[place] Bard  dual-lumen power PICC cut to 39 cm. It was

inserted through the peel-away sheath. Peel-away sheath and guidewire removed.

Catheter fixed to the skin. Both catheter ports aspirated and flushed. Patient

tolerated procedure well, without immediate complication. Digital radiograph

documents satisfactory catheter tip position, at the cavoatrial junction. 

 

Total fluoroscopy time 16.5 seconds.

Total dose area product  [id number]  [id number]

Total number of images: 1

 

Impression: Successful placement of left arm PICC under sonographic and fluoroscopic

guidance, as described above.

## 2020-09-11 NOTE — NUR
***INSURANCE***



UPDATED CLINICALS AND REVIEWS HAVE BEEN FAXED TO:



AUTH#[id number] 

[facility]

P:

F:[phone]

## 2020-09-11 NOTE — NUR
DISCHARGE PLANNING



FOLLOW UP CALL MADE TO CHRISTOS AT [facility] [phone] EXT [phone]. PER CHRISTOS, AND AUTH IS 
BEING GENERATED FOR PATIENT TO ADMIT TO [facility]. SHE 
WILL CALL BACK WITH UPDATE.

## 2020-09-11 NOTE — NUR
NOTE

NOY W/ CHRISTOS FROM [facility] 

T: [phone]  EXT: [phone]

SEEKING PLACEMENT FOR assisted VS RECUPERATIVE CARE

## 2020-09-11 NOTE — NUR
NURSE HAND-OFF: 



Important Events on Shift:

Patient Status: stable

Diet: Regular



Pending Orders: 

Pending Results/Labs:

Pending MD notification:



Latest Vital Signs: Temperature 97.5 , Pulse 70 , B/P 148 /97 , Respiratory Rate 18 , O2 SAT 
98 , Room Air, O2 Flow Rate 3.0 .  

Vital Sign Comment: 



Latest Brennan Fall Score: 45  

Fall Risk: High Risk 

Safety Measures: Call light Within Reach, Bed Alarm Zone 1, Side Rails Side Rails x2, Bed 
position Low and Locked.

Fall Precautions: 

Yellow Socks



Report given to Erin ROBERSON.

## 2020-09-11 NOTE — BRIEF OPERATIVE NOTE
Immediate Post Operative Note


Operative Note


Pre-op Diagnosis:


needs long term IV access


Procedure:


PICC L arm


Post-op Diagnosis:  same as pre-op


Surgeon:  LOIS Moreno


Specimen:  none


Complications:  none


Fluids:  none


Implant(s) used?:  No











Maxwell Moreno MD Sep 11, 2020 15:45

## 2020-09-11 NOTE — NUR
NURSE HAND-OFF: 



Important Events on Shift: S/P PICC placement double lumen on STEPHEN, pending DCP to recup care 
awaiting insurance authorization

Patient Status: Stable

Diet: Regular



Pending Orders: None

Pending Results/Labs: None

Pending MD notification:



Latest Vital Signs: Temperature 97.2 , Pulse 75 , B/P 137 /74 , Respiratory Rate 18 , O2 SAT 
100 , Room Air, O2 Flow Rate 3.0 .  

Vital Sign Comment: 



Latest Brennan Fall Score: 45  

Fall Risk: High Risk 

Safety Measures: Call light Within Reach, Bed Alarm Zone 1, Side Rails Side Rails x2, Bed 
position Low and Locked.

Fall Precautions: 

Yellow Socks



Report given to DA Saxena.

## 2020-09-11 NOTE — PRE-PROCEDURE NOTE/ATTESTATION
Pre-Procedure Note/Attestation


Complete Prior to Procedure


Planned Procedure:  not applicable


Procedure Narrative:


PICC





Indications for Procedure


Pre-Operative Diagnosis:


needs long term IV access





Attestation


I attest that I discussed the nature of the procedure; its benefits; risks and 

complications; and alternatives (and the risks and benefits of such alternatives

), prior to the procedure, with the patient (or the patient's legal 

representative).





I attest that, if there was a reasonable possibility of needing a blood 

transfusion, the patient (or the patient's legal representative) was given the 

[facility] standardized written summary, pursuant 

to the Robbie Omkar Blood Safety Act ([place] Health and Safety Code # 1645, as 

amended).





I attest that I re-evaluated the patient just prior to the surgery and that 

there has been no change in the patient's H&P, except as documented below:











Maxwell Phillips MD Sep 11, 2020 15:44

## 2020-09-12 VITALS — DIASTOLIC BLOOD PRESSURE: 95 MMHG | SYSTOLIC BLOOD PRESSURE: 151 MMHG

## 2020-09-12 VITALS — DIASTOLIC BLOOD PRESSURE: 73 MMHG | SYSTOLIC BLOOD PRESSURE: 113 MMHG

## 2020-09-12 VITALS — DIASTOLIC BLOOD PRESSURE: 96 MMHG | SYSTOLIC BLOOD PRESSURE: 155 MMHG

## 2020-09-12 VITALS — DIASTOLIC BLOOD PRESSURE: 67 MMHG | SYSTOLIC BLOOD PRESSURE: 146 MMHG

## 2020-09-12 VITALS — SYSTOLIC BLOOD PRESSURE: 140 MMHG | DIASTOLIC BLOOD PRESSURE: 87 MMHG

## 2020-09-12 VITALS — DIASTOLIC BLOOD PRESSURE: 95 MMHG | SYSTOLIC BLOOD PRESSURE: 147 MMHG

## 2020-09-12 RX ADMIN — SODIUM CHLORIDE SCH MLS/HR: 0.9 INJECTION INTRAVENOUS at 05:19

## 2020-09-12 RX ADMIN — CHLORHEXIDINE GLUCONATE SCH APPLIC: 213 SOLUTION TOPICAL at 21:06

## 2020-09-12 RX ADMIN — SODIUM CHLORIDE SCH MLS/HR: 0.9 INJECTION INTRAVENOUS at 13:54

## 2020-09-12 RX ADMIN — FOLIC ACID SCH MLS/HR: 5 INJECTION, SOLUTION INTRAMUSCULAR; INTRAVENOUS; SUBCUTANEOUS at 09:18

## 2020-09-12 RX ADMIN — HEPARIN SODIUM SCH UNITS: 5000 INJECTION INTRAVENOUS; SUBCUTANEOUS at 08:48

## 2020-09-12 RX ADMIN — SODIUM CHLORIDE SCH MLS/HR: 0.9 INJECTION INTRAVENOUS at 22:48

## 2020-09-12 RX ADMIN — VANCOMYCIN HYDROCHLORIDE SCH MLS/HR: 500 INJECTION, POWDER, LYOPHILIZED, FOR SOLUTION INTRAVENOUS at 21:06

## 2020-09-12 RX ADMIN — VANCOMYCIN HYDROCHLORIDE SCH MLS/HR: 500 INJECTION, POWDER, LYOPHILIZED, FOR SOLUTION INTRAVENOUS at 10:25

## 2020-09-12 RX ADMIN — HEPARIN SODIUM SCH UNITS: 5000 INJECTION INTRAVENOUS; SUBCUTANEOUS at 21:08

## 2020-09-12 RX ADMIN — SODIUM CHLORIDE AND POTASSIUM CHLORIDE SCH MLS/HR: 9; 1.49 INJECTION, SOLUTION INTRAVENOUS at 09:18

## 2020-09-12 NOTE — NUR
CASE MANAGEMENT:REVIEW



SI;SEPSIS. DIPHTEROIDS BACTEREMIA.

98.1   77   20   151/95   98% ON RA



IS;CEFEPIME IV Q8

VANCOMYCIN IV Q12

LABETALOL PO TID

FOLIC ACID/MAG SULF/KCL IV Q24

HEPARIN SUBQ Q12



***MED SURG STATUS

DCP; PATIENT IS HOMELESS



PLAN;

INSURANCE IS SEEKING PLACEMENT

## 2020-09-12 NOTE — NUR
***INSURANCE***



UPDATED CLINICALS AND REVIEWS HAVE BEEN FAXED TO:



AUTH#[id number] 

[facility]

P:

F:[phone]

## 2020-09-12 NOTE — INTERNAL MED PROGRESS NOTE
Subjective


Date of Service:  Sep 12, 2020


Physician Name


IrisDon


Attending Physician


Shakeel Marrufo MD





Current Medications








 Medications


  (Trade)  Dose


 Ordered  Sig/Yeison


 Route


 PRN Reason  Start Time


 Stop Time Status Last Admin


Dose Admin


 


 Acetaminophen


  (Tylenol)  650 mg  Q4H  PRN


 ORAL


 fever  9/4/20 16:51


 10/4/20 16:50  9/6/20 11:25


 


 


 Amlodipine


 Besylate


  (Norvasc)  10 mg  DAILY


 ORAL


   9/5/20 12:45


 10/5/20 12:44  9/12/20 08:47


 


 


 Cefepime HCl 1 gm/


 Dextrose  55 ml @ 


 110 mls/hr  EVERY 8  HOURS


 IVPB


   9/11/20 22:00


 9/18/20 21:59  9/12/20 05:19


 


 


 Chlorhexidine


 Gluconate


  (Alysha-Hex 2%)  1 applic  DAILY@2000


 TOPIC


   9/10/20 20:00


 12/9/20 19:59  9/11/20 21:17


 


 


 Dextrose


  (Dextrose 50%)  25 ml  Q30M  PRN


 IV


 Hypoglycemia  9/4/20 16:52


 12/3/20 16:51   


 


 


 Dextrose


  (Dextrose 50%)  50 ml  Q30M  PRN


 IV


 Hypoglycemia  9/4/20 16:52


 12/3/20 16:51   


 


 


 Folic Acid 1 mg/


 Magnesium Sulfate


 2000 mg/


 Multivitamins 10


 ml/Potassium


 Chloride/Sodium


 Chloride  1,014.2 ml


  @ 124.876


 mls/hr  Q24H


 IV


   9/5/20 09:00


 10/4/20 08:59  9/12/20 09:18


 


 


 Gadobutrol


  (Gadavist)  7.5 mmol  NOW  PRN


 IV


 Radiology Procedure  9/8/20 16:45


 9/12/20 16:33   


 


 


 Heparin Sodium


  (Porcine)


  (Heparin 5000


 units/ml)  5,000 units  EVERY 12  HOURS


 SUBQ


   9/4/20 21:00


 10/19/20 08:59  9/12/20 08:48


 


 


 Heparin Sodium/


 Sodium Chloride


  (Heparin 1000


 units/500ml


 Premix)  1,000 unit  ONCE  PRN


 INJ


 PICC LINE  9/11/20 10:00


 9/13/20 23:59   


 


 


 Labetalol HCl


  (Normodyne)  400 mg  TID


 ORAL


   9/5/20 13:00


 10/5/20 12:59  9/12/20 08:47


 


 


 Lidocaine HCl


  (Xylocaine 1%


 30ml)  30 ml  ONCE  PRN


 INJ


 PICC LINE  9/11/20 10:00


 9/13/20 23:59   


 


 


 Ondansetron HCl


  (Zofran)  4 mg  Q6H  PRN


 IVP


 Nausea & Vomiting  9/4/20 16:52


 10/4/20 16:51   


 


 


 Polyethylene


 Glycol


  (Miralax)  17 gm  HSPRN  PRN


 ORAL


 Constipation  9/4/20 16:52


 10/4/20 16:51   


 


 


 Thiamine HCl 100


 mg/Dextrose  56 ml @ 


 112 mls/hr  Q24H


 IVPB


   9/5/20 09:00


 10/4/20 08:59  9/12/20 09:18


 


 


 Vancomycin HCl


  ([facility] pharmacy


 to dose)  1 ea  DAILY  PRN


 MISC


 Per rx protocol  9/5/20 09:00


 10/4/20 10:29   


 


 


 Vancomycin HCl


 500 mg/Dextrose  110 ml @ 


 110 mls/hr  Q12HR@1000,2200


 IVPB


   9/10/20 10:00


 9/15/20 09:59  9/12/20 10:25


 








Allergies:  


Coded Allergies:  


     No Known Allergies (Unverified , 11/18/15)


Subjective


56 YO M with history of alcohol abuse admitted with cough.  Now UTI.  Cover for 

Int Med- Dr Marrufo.  Transesophageal echocardiogram rescheduled for Tue 9/15/20.

  C/O right side weakness





Objective





Last Vital Signs








  Date Time  Temp Pulse Resp B/P (MAP) Pulse Ox O2 Delivery O2 Flow Rate FiO2


 


9/12/20 12:00 98.1 69 20 151/95 (113) 98   


 


9/12/20 09:00      Room Air  


 


9/4/20 12:00       3.0 

















Intake and Output  


 


 9/11/20 9/12/20





 19:00 07:00


 


Intake Total 624.876 ml 769.752 ml


 


Output Total 2500 ml 2050 ml


 


Balance -1875.124 ml -1280.248 ml


 


  


 


Intake Oral 500 ml 300 ml


 


IV Total 124.876 ml 469.752 ml


 


Output Urine Total 2500 ml 2050 ml


 


# Bowel Movements 1 








Objective


Objective


General: No acute distress, awake and alert


HEENT: NCAT, sclera anicteric, PERRL, EOMI.


Neck: Supple, no significant jugular venous distention, 


Lungs: Good inspiratory effort, no accessory muscle use, clear to auscultation 

bilaterally, no Wheeze or Rales.


Heart: Regular rate and rhythm, normal S1/S2, no murmurs.


Abdomen: soft, nontender, nondistended. Normoactive bowel sounds, mild obesity.


 / Rectal: Refused and deferred.


Extremities: No Cyanosis , clubbing or edema. 


Neuro: A&O x 3, Able to move all extremities


Skin: warm, no rashes or lesions


Psych: Normal mood and affect





Assessment/Plan


ASSESSMENT:


1. Sepsis most likely due to UTI vs SIRS.


2. Leukocytosis with high fever.


3. Hypertension.


4. Alcoholism with alcohol withdrawal.


5. Homeless.


6.  Possible endocarditis


7.  Right side weakness





PLAN:  


in Medical Unit 


Abx: Vancomycin and Cefepime.  


Follow up with cultures, 2D echo, HIV test, RPR, and viral load.  


Dr. Kitchen consultation with Infectious Disease


Dr. Cook with Pulmonary/Critical Care.  


Code status: Full Code.  


DVT prophylaxis: Heparin subcu.


Await transesophageal echocardiogram 9/15/20


MRI brain=normal











Don Simmons MD Sep 12, 2020 13:08

## 2020-09-12 NOTE — NUR
NURSE NOTES:

pt is in the bed awake and alert. patient is eating breakfast at this time. no acute 
distress noted. call light is within reach.

## 2020-09-12 NOTE — INFECTIOUS DISEASES PROG NOTE
Assessment/Plan








Assessment:


COVID19 neg x2 (9/3 rapid PCR neg; 9/4 SARS-COV2 PCR neg)





r/o PRobable endocarditis


has R side weakness- ?etiology - MRI brain neg for CVA


    -MRI Brain: Chronic and age-related changes. Negative for acute 

intracranial bleed, mass effect, infarct, or contrast enhancing lesion. No 

venous or dural sinus abnormality demonstrated 


   -2d echo: possible discrete vegetation on MV





SIRS vs Sepsis


   9/4 ua wbc 60-80, nit neg, leuk +1; ucx neg


         sp cx p


    UCx 9/3/20 - GPC < 10, 000; u/a wbc 10-15, nit neg, leuk neg


   9/3 CXR: no acute process





High fever, SP


Leukocytosis, resolved


   -u/a wbc 10-15, nit neg, leuk est neg


   -CXR  report pending (per ER interpretation: no consolidation, no effusion, 

no pneumothorax, no acute cardiopulmonary disease)








Diphteroids bactremia- ?contaminant


   -9/3 Bcx 1/4 diphteroids; 9/4 Bcx NTD; 9/8 Bcx NTD





Alcohol withdrawal


   -9/6 CT head:  Age-related changes with no acute intracranial findings.


 


Multiple skin abrasions


   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





HTN


homelessness


ETOH abuse





HIV ab screen (Neg)


RPR neg





Plan:


-On empiric IV Vancomycin #9 and  Cefepime #8- will continue until PATRICE is done


   -ok to dc to SNF from ID standpoint and continue above IV abx


    - 9/4/20 SP Amikacin #1





-f/u wound cx


-Monitor CBC/CMP, temperatures


-COVID19 neg x2


-Cdiff if diarrhea


-PATRICE to eval for endocarditis based on 2d echo results; scheduled for 9/15


-defer to primary further workup for RUE weakness; discussed with Dr Simmons





Thank  you for consulting [facility].  Will continue to follow along with 

you.





Discussed with RN





Subjective


Allergies:  


Coded Allergies:  


     No Known Allergies (Unverified , 11/18/15)


afebrile


no leuokcytosis





Objective





Last 24 Hour Vital Signs








  Date Time  Temp Pulse Resp B/P (MAP) Pulse Ox O2 Delivery O2 Flow Rate FiO2


 


9/12/20 16:00 97.9 89 18 155/96 (115) 98   


 


9/12/20 13:54  69  151/95    


 


9/12/20 12:00 98.1 69 20 151/95 (113) 98   


 


9/12/20 09:00      Room Air  


 


9/12/20 08:47  77  140/87    


 


9/12/20 08:47  77  140/87    


 


9/12/20 08:00 98.0 77 18 140/87 (104) 99   


 


9/12/20 04:00 98.1 74 18 147/95 (112) 98   


 


9/12/20 00:00 97.5 81 18 146/67 (93) 98   


 


9/11/20 21:00      Room Air  


 


9/11/20 20:00 97.3 76 18 119/77 (91) 97   


 


9/11/20 17:59  75  137/74    








Height (Feet):  5


Height (Inches):  7.00


Weight (Pounds):  160


General Appearance:  WD/WN


HEENT:  normocephalic, anicteric


Respiratory:  chest wall non-tender, lungs clear


Cardiovascular:  normal peripheral pulses, normal rate


Abdomen:  normal bowel sounds, soft, non tender


Extremities:  no cyanosis


Skin:  no rash





Current Medications








 Medications


  (Trade)  Dose


 Ordered  Sig/Yeison


 Route


 PRN Reason  Start Time


 Stop Time Status Last Admin


Dose Admin


 


 Acetaminophen


  (Tylenol)  650 mg  Q4H  PRN


 ORAL


 fever  9/4/20 16:51


 10/4/20 16:50  9/6/20 11:25


 


 


 Amlodipine


 Besylate


  (Norvasc)  10 mg  DAILY


 ORAL


   9/5/20 12:45


 10/5/20 12:44  9/12/20 08:47


 


 


 Cefepime HCl 1 gm/


 Dextrose  55 ml @ 


 110 mls/hr  EVERY 8  HOURS


 IVPB


   9/11/20 22:00


 9/18/20 21:59  9/12/20 13:54


 


 


 Chlorhexidine


 Gluconate


  (Alysha-Hex 2%)  1 applic  DAILY@2000


 TOPIC


   9/10/20 20:00


 12/9/20 19:59  9/11/20 21:17


 


 


 Dextrose


  (Dextrose 50%)  25 ml  Q30M  PRN


 IV


 Hypoglycemia  9/4/20 16:52


 12/3/20 16:51   


 


 


 Dextrose


  (Dextrose 50%)  50 ml  Q30M  PRN


 IV


 Hypoglycemia  9/4/20 16:52


 12/3/20 16:51   


 


 


 Folic Acid 1 mg/


 Magnesium Sulfate


 2000 mg/


 Multivitamins 10


 ml/Potassium


 Chloride/Sodium


 Chloride  1,014.2 ml


  @ 124.876


 mls/hr  Q24H


 IV


   9/5/20 09:00


 10/4/20 08:59  9/12/20 09:18


 


 


 Heparin Sodium


  (Porcine)


  (Heparin 5000


 units/ml)  5,000 units  EVERY 12  HOURS


 SUBQ


   9/4/20 21:00


 10/19/20 08:59  9/12/20 08:48


 


 


 Heparin Sodium/


 Sodium Chloride


  (Heparin 1000


 units/500ml


 Premix)  1,000 unit  ONCE  PRN


 INJ


 PICC LINE  9/11/20 10:00


 9/13/20 23:59   


 


 


 Labetalol HCl


  (Normodyne)  400 mg  TID


 ORAL


   9/5/20 13:00


 10/5/20 12:59  9/12/20 13:54


 


 


 Lidocaine HCl


  (Xylocaine 1%


 30ml)  30 ml  ONCE  PRN


 INJ


 PICC LINE  9/11/20 10:00


 9/13/20 23:59   


 


 


 Ondansetron HCl


  (Zofran)  4 mg  Q6H  PRN


 IVP


 Nausea & Vomiting  9/4/20 16:52


 10/4/20 16:51   


 


 


 Polyethylene


 Glycol


  (Miralax)  17 gm  HSPRN  PRN


 ORAL


 Constipation  9/4/20 16:52


 10/4/20 16:51   


 


 


 Thiamine HCl 100


 mg/Dextrose  56 ml @ 


 112 mls/hr  Q24H


 IVPB


   9/5/20 09:00


 10/4/20 08:59  9/12/20 09:18


 


 


 Vancomycin HCl


  ([facility] pharmacy


 to dose)  1 ea  DAILY  PRN


 MISC


 Per rx protocol  9/5/20 09:00


 10/4/20 10:29   


 


 


 Vancomycin HCl


 500 mg/Dextrose  110 ml @ 


 110 mls/hr  Q12HR@1000,2200


 IVPB


   9/10/20 10:00


 9/15/20 09:59  9/12/20 10:25


 

















Heidi Kitchen M.D. Sep 12, 2020 17:53

## 2020-09-12 NOTE — NUR
NURSE HAND-OFF: 



Important Events on Shift:[none. discharge planning]

Patient Status: [stable]

Diet: [Regular]



Pending Orders: [none]

Pending Results/Labs:[none]

Pending MD notification:[none]



Latest Vital Signs: Temperature 98.1 , Pulse 74 , B/P 147 /95 , Respiratory Rate 18 , O2 SAT 
98 , Room Air, O2 Flow Rate 3.0 .  

Vital Sign Comment: [stable]



Latest Brennan Fall Score: 45  

Fall Risk: High Risk 

Safety Measures: Call light Within Reach, Bed Alarm Zone 1, Side Rails Side Rails x2, Bed 
position Low and Locked.

Fall Precautions: 

Yellow Socks



Report given to [Efren RN].

## 2020-09-12 NOTE — NUR
NURSE NOTES:

Received a pt awake,A&Ox4, and verbal. No sob,fever, and cough at the moment. pt has a picc 
line; it is intact and asymptomatic. Bed is in the lowest position,locked, and call light 
within reach. We will keep monitoring the pt.

## 2020-09-13 VITALS — SYSTOLIC BLOOD PRESSURE: 152 MMHG | DIASTOLIC BLOOD PRESSURE: 104 MMHG

## 2020-09-13 VITALS — SYSTOLIC BLOOD PRESSURE: 122 MMHG | DIASTOLIC BLOOD PRESSURE: 78 MMHG

## 2020-09-13 VITALS — DIASTOLIC BLOOD PRESSURE: 85 MMHG | SYSTOLIC BLOOD PRESSURE: 132 MMHG

## 2020-09-13 VITALS — DIASTOLIC BLOOD PRESSURE: 88 MMHG | SYSTOLIC BLOOD PRESSURE: 115 MMHG

## 2020-09-13 VITALS — DIASTOLIC BLOOD PRESSURE: 91 MMHG | SYSTOLIC BLOOD PRESSURE: 114 MMHG

## 2020-09-13 VITALS — SYSTOLIC BLOOD PRESSURE: 137 MMHG | DIASTOLIC BLOOD PRESSURE: 91 MMHG

## 2020-09-13 LAB
ADD MANUAL DIFF: NO
ANION GAP SERPL CALC-SCNC: 10 MMOL/L (ref 5–15)
BASOPHILS NFR BLD AUTO: 1.7 % (ref 0–2)
BUN SERPL-MCNC: 16 MG/DL (ref 7–18)
CALCIUM SERPL-MCNC: 9.6 MG/DL (ref 8.5–10.1)
CHLORIDE SERPL-SCNC: 105 MMOL/L (ref 98–107)
CO2 SERPL-SCNC: 24 MMOL/L (ref 21–32)
CREAT SERPL-MCNC: 1.1 MG/DL (ref 0.55–1.3)
EOSINOPHIL NFR BLD AUTO: 1.3 % (ref 0–3)
ERYTHROCYTE [DISTWIDTH] IN BLOOD BY AUTOMATED COUNT: 14.9 % (ref 11.6–14.8)
HCT VFR BLD CALC: 36.5 % (ref 42–52)
HGB BLD-MCNC: 11.7 G/DL (ref 14.2–18)
LYMPHOCYTES NFR BLD AUTO: 24.7 % (ref 20–45)
MCV RBC AUTO: 94 FL (ref 80–99)
MONOCYTES NFR BLD AUTO: 6.9 % (ref 1–10)
NEUTROPHILS NFR BLD AUTO: 65.4 % (ref 45–75)
PLATELET # BLD: 361 K/UL (ref 150–450)
POTASSIUM SERPL-SCNC: 4.1 MMOL/L (ref 3.5–5.1)
RBC # BLD AUTO: 3.89 M/UL (ref 4.7–6.1)
SODIUM SERPL-SCNC: 139 MMOL/L (ref 136–145)
WBC # BLD AUTO: 7.5 K/UL (ref 4.8–10.8)

## 2020-09-13 RX ADMIN — VANCOMYCIN HYDROCHLORIDE SCH MLS/HR: 500 INJECTION, POWDER, LYOPHILIZED, FOR SOLUTION INTRAVENOUS at 22:30

## 2020-09-13 RX ADMIN — FOLIC ACID SCH MLS/HR: 5 INJECTION, SOLUTION INTRAMUSCULAR; INTRAVENOUS; SUBCUTANEOUS at 09:00

## 2020-09-13 RX ADMIN — HEPARIN SODIUM SCH UNITS: 5000 INJECTION INTRAVENOUS; SUBCUTANEOUS at 08:26

## 2020-09-13 RX ADMIN — VANCOMYCIN HYDROCHLORIDE SCH MLS/HR: 500 INJECTION, POWDER, LYOPHILIZED, FOR SOLUTION INTRAVENOUS at 09:59

## 2020-09-13 RX ADMIN — SODIUM CHLORIDE SCH MLS/HR: 0.9 INJECTION INTRAVENOUS at 14:47

## 2020-09-13 RX ADMIN — SODIUM CHLORIDE SCH MLS/HR: 0.9 INJECTION INTRAVENOUS at 21:27

## 2020-09-13 RX ADMIN — SODIUM CHLORIDE SCH MLS/HR: 0.9 INJECTION INTRAVENOUS at 05:54

## 2020-09-13 RX ADMIN — CHLORHEXIDINE GLUCONATE SCH APPLIC: 213 SOLUTION TOPICAL at 21:27

## 2020-09-13 RX ADMIN — HEPARIN SODIUM SCH UNITS: 5000 INJECTION INTRAVENOUS; SUBCUTANEOUS at 21:31

## 2020-09-13 RX ADMIN — SODIUM CHLORIDE AND POTASSIUM CHLORIDE SCH MLS/HR: 9; 1.49 INJECTION, SOLUTION INTRAVENOUS at 09:00

## 2020-09-13 NOTE — NUR
CASE MANAGEMENT:REVIEW



SI;SEPSIS. DIPHTEROIDS BACTEREMIA.

97.5   86   19   152/104   99% ON RA



IS;CEFEPIME IV Q8

VANCOMYCIN IV Q12

LABETALOL PO TID

FOLIC ACID/MAG SULF/KCL IV Q24

HEPARIN SUBQ Q12



***MED SURG STATUS

DCP; PATIENT IS HOMELESS



PLAN;

INSURANCE (MED POINT) IS SEEKING PLACEMENT PER MAKENZIE

## 2020-09-13 NOTE — NUR
NURSE HAND-OFF: 



Important Events on Shift:[]

Patient Status: [stable]

Diet: [reg]



Pending Orders: [cbc, bmp]

Pending Results/Labs:[]

Pending MD notification:[]



Latest Vital Signs: Temperature 97.3 , Pulse 69 , B/P 137 /91 , Respiratory Rate 19 , O2 SAT 
100 , Room Air, O2 Flow Rate 3.0 .  

Vital Sign Comment: []



Latest Brennan Fall Score: 45  

Fall Risk: High Risk 

Safety Measures: Call light Within Reach, Bed Alarm Zone 1, Side Rails Side Rails x2, Bed 
position Low and Locked.

Fall Precautions: 

Yellow Socks



Report given to [DA Domínguez].

## 2020-09-13 NOTE — NUR
NURSE HAND-OFF: 



Important Events on Shift:na

Patient Status: stable

Diet: reg



Pending Orders: 

Pending Results/Labs:cbc,bmp

Pending MD notification:



Latest Vital Signs: Temperature 97.6 , Pulse 81 , B/P 132 /85 , Respiratory Rate 20 , O2 SAT 
98 , Room Air, O2 Flow Rate 3.0 .  

Vital Sign Comment: 



Latest Brennan Fall Score: 45  

Fall Risk: High Risk 

Safety Measures: Call light Within Reach, Bed Alarm Zone 1, Side Rails Side Rails x2, Bed 
position Low and Locked.

Fall Precautions: 

Yellow Socks



Report given to DA BUNDY.

## 2020-09-13 NOTE — NUR
NURSE NOTES:

received patient lying in bed, awake, oriented, no complaint of pain or discomfort. In room 
air, no respiratory distress noted. Patient has IV central access PICC FAHEEM MITCHELL at the 
moment. Bed locked at the lowest position possible, call light within easy reach, siderails 
up x2. Will continue to monitor patient and follow up with the plan of care.

## 2020-09-13 NOTE — NUR
***INSURANCE***



UPDATED CLINICALS AND REVIEWS HAVE BEEN FAXED TO:



AUTH#[id number] 

[facility]

P:

F:[phone]

## 2020-09-13 NOTE — INTERNAL MED PROGRESS NOTE
Subjective


Date of Service:  Sep 13, 2020


Physician Name


Don Simmons


Attending Physician


Shakeel Marrufo MD





Current Medications








 Medications


  (Trade)  Dose


 Ordered  Sig/Yeison


 Route


 PRN Reason  Start Time


 Stop Time Status Last Admin


Dose Admin


 


 Acetaminophen


  (Tylenol)  650 mg  Q4H  PRN


 ORAL


 fever  9/4/20 16:51


 10/4/20 16:50  9/6/20 11:25


 


 


 Amlodipine


 Besylate


  (Norvasc)  10 mg  DAILY


 ORAL


   9/5/20 12:45


 10/5/20 12:44  9/13/20 08:25


 


 


 Cefepime HCl 1 gm/


 Dextrose  55 ml @ 


 110 mls/hr  EVERY 8  HOURS


 IVPB


   9/11/20 22:00


 9/18/20 21:59  9/13/20 14:47


 


 


 Chlorhexidine


 Gluconate


  (Alysha-Hex 2%)  1 applic  DAILY@2000


 TOPIC


   9/10/20 20:00


 12/9/20 19:59  9/12/20 21:06


 


 


 Dextrose


  (Dextrose 50%)  25 ml  Q30M  PRN


 IV


 Hypoglycemia  9/4/20 16:52


 12/3/20 16:51   


 


 


 Dextrose


  (Dextrose 50%)  50 ml  Q30M  PRN


 IV


 Hypoglycemia  9/4/20 16:52


 12/3/20 16:51   


 


 


 Folic Acid 1 mg/


 Magnesium Sulfate


 2000 mg/


 Multivitamins 10


 ml/Potassium


 Chloride/Sodium


 Chloride  1,014.2 ml


  @ 124.876


 mls/hr  Q24H


 IV


   9/5/20 09:00


 10/4/20 08:59  9/13/20 09:00


 


 


 Heparin Sodium


  (Porcine)


  (Heparin 5000


 units/ml)  5,000 units  EVERY 12  HOURS


 SUBQ


   9/4/20 21:00


 10/19/20 08:59  9/13/20 08:26


 


 


 Heparin Sodium/


 Sodium Chloride


  (Heparin 1000


 units/500ml


 Premix)  1,000 unit  ONCE  PRN


 INJ


 PICC LINE  9/11/20 10:00


 9/13/20 23:59   


 


 


 Labetalol HCl


  (Normodyne)  400 mg  TID


 ORAL


   9/5/20 13:00


 10/5/20 12:59  9/13/20 12:20


 


 


 Lidocaine HCl


  (Xylocaine 1%


 30ml)  30 ml  ONCE  PRN


 INJ


 PICC LINE  9/11/20 10:00


 9/13/20 23:59   


 


 


 Ondansetron HCl


  (Zofran)  4 mg  Q6H  PRN


 IVP


 Nausea & Vomiting  9/4/20 16:52


 10/4/20 16:51   


 


 


 Polyethylene


 Glycol


  (Miralax)  17 gm  HSPRN  PRN


 ORAL


 Constipation  9/4/20 16:52


 10/4/20 16:51   


 


 


 Thiamine HCl 100


 mg/Dextrose  56 ml @ 


 112 mls/hr  Q24H


 IVPB


   9/5/20 09:00


 10/4/20 08:59  9/13/20 09:00


 


 


 Vancomycin HCl


  ([facility] pharmacy


 to dose)  1 ea  DAILY  PRN


 MISC


 Per rx protocol  9/5/20 09:00


 10/4/20 10:29   


 


 


 Vancomycin HCl


 500 mg/Dextrose  110 ml @ 


 110 mls/hr  Q12HR@1000,2200


 IVPB


   9/10/20 10:00


 9/15/20 09:59  9/13/20 09:59


 








Allergies:  


Coded Allergies:  


     No Known Allergies (Unverified , 11/18/15)


ROS Limited/Unobtainable:  No


Constitutional:  Reports: no symptoms


HEENT:  Reports: no symptoms


Cardiovascular:  Reports: no symptoms


Respiratory:  Reports: no symptoms


Gastrointestinal/Abdominal:  Reports: no symptoms


Genitourinary:  Reports: no symptoms


Neurologic/Psychiatric:  Reports: no symptoms


Subjective


54 YO M with history of alcohol abuse admitted with cough.  Now UTI.  Cover for 

Int Med- Dr Marrufo.  Transesophageal echocardiogram rescheduled for Tue 9/15/20.

  C/O right side weakness





Objective





Last Vital Signs








  Date Time  Temp Pulse Resp B/P (MAP) Pulse Ox O2 Delivery O2 Flow Rate FiO2


 


9/13/20 12:20  64  115/88    


 


9/13/20 12:00 97.5  18  99   


 


9/13/20 09:00      Room Air  


 


9/4/20 12:00       3.0 











Laboratory Tests








Test


  9/13/20


05:00


 


White Blood Count


  7.5 K/UL


(4.8-10.8)


 


Red Blood Count


  3.89 M/UL


(4.70-6.10)  L


 


Hemoglobin


  11.7 G/DL


(14.2-18.0)  L


 


Hematocrit


  36.5 %


(42.0-52.0)  L


 


Mean Corpuscular Volume 94 FL (80-99)  


 


Mean Corpuscular Hemoglobin


  30.2 PG


(27.0-31.0)


 


Mean Corpuscular Hemoglobin


Concent 32.1 G/DL


(32.0-36.0)


 


Red Cell Distribution Width


  14.9 %


(11.6-14.8)  H


 


Platelet Count


  361 K/UL


(150-450)


 


Mean Platelet Volume


  6.3 FL


(6.5-10.1)  L


 


Neutrophils (%) (Auto)


  65.4 %


(45.0-75.0)


 


Lymphocytes (%) (Auto)


  24.7 %


(20.0-45.0)


 


Monocytes (%) (Auto)


  6.9 %


(1.0-10.0)


 


Eosinophils (%) (Auto)


  1.3 %


(0.0-3.0)


 


Basophils (%) (Auto)


  1.7 %


(0.0-2.0)


 


Sodium Level


  139 MMOL/L


(136-145)


 


Potassium Level


  4.1 MMOL/L


(3.5-5.1)


 


Chloride Level


  105 MMOL/L


()


 


Carbon Dioxide Level


  24 MMOL/L


(21-32)


 


Anion Gap


  10 mmol/L


(5-15)


 


Blood Urea Nitrogen


  16 mg/dL


(7-18)


 


Creatinine


  1.1 MG/DL


(0.55-1.30)


 


Estimat Glomerular Filtration


Rate > 60 mL/min


(>60)


 


Glucose Level


  118 MG/DL


()  H


 


Calcium Level


  9.6 MG/DL


(8.5-10.1)

















Intake and Output  


 


 9/12/20 9/13/20





 19:00 07:00


 


Intake Total 1620 ml 1000 ml


 


Output Total  1850 ml


 


Balance 1620 ml -850 ml


 


  


 


Intake Oral 1620 ml 1000 ml


 


Output Urine Total  1850 ml








Objective


Objective


General: No acute distress, awake and alert


HEENT: NCAT, sclera anicteric, PERRL, EOMI.


Neck: Supple, no significant jugular venous distention, 


Lungs: Good inspiratory effort, no accessory muscle use, clear to auscultation 

bilaterally, no Wheeze or Rales.


Heart: Regular rate and rhythm, normal S1/S2, no murmurs.


Abdomen: soft, nontender, nondistended. Normoactive bowel sounds, mild obesity.


 / Rectal: Refused and deferred.


Extremities: No Cyanosis , clubbing or edema. 


Neuro: A&O x 3, Able to move all extremities


Skin: warm, no rashes or lesions


Psych: Normal mood and affect





Assessment/Plan


ASSESSMENT:


1. Sepsis most likely due to UTI vs SIRS.


2. Leukocytosis with high fever.


3. Hypertension.


4. Alcoholism with alcohol withdrawal.


5. Homeless.


6.  Possible endocarditis


7.  Right side weakness





PLAN:  


in Medical Unit 


Abx: Vancomycin and Cefepime.  


Follow up with cultures, 2D echo, HIV test, RPR, and viral load.  


Dr. Kitchen consultation with Infectious Disease


Dr. Cook with Pulmonary/Critical Care.  


Code status: Full Code.  


DVT prophylaxis: Heparin subcu.


Await transesophageal echocardiogram 9/15/20


MRI brain=normal











Don Simmons MD Sep 13, 2020 16:02

## 2020-09-13 NOTE — NUR
NURSE NOTES:

patient asked nurse to throw away dark green pants (1) and a white T-shirt (1). Updated 
belongings list.

## 2020-09-13 NOTE — NUR
RD ASSESSMENT & RECOMMENDATIONS

SEE CARE ACTIVITY FOR COMPLETE ASSESSMENT



DAILY ESTIMATED NEEDS:

Needs based on Sepsis, 73kg  

25-35  kcals/kg 

1722-9474  total kcals

1.25-2  g protein/kg

  g total protein 

25-30  mL/kg

8511-4787  total fluid mLs



NUTRITION DIAGNOSIS:

Increased kcal and pro needs r/t wound healing as evidenced by pt w/

multiple areas of skin breakdown, including DTPI R Hip, partially opened

DTPI Sacrum,R and L Buttocks, partially opened wound with soft loose

necrotic cap R coccygeal, partial thickness Pressure injury.



CURRENT DIET: Regular    





PO DIET RECOMMENDATIONS:

LOW NA DIET  





ADDITIONAL RECOMMENDATIONS:

1) Wound care: add MAYA BID  

2) Obtain a standing scale wt 

3) Add high pro snacks in b/w meals 

    Ensure Enlive qdaily  

4) Monitor and record po intake - now w/ good intake

## 2020-09-13 NOTE — NUR
NURSE NOTES:

called pharmacy, spoken to Sandra pharmacist, got ok to run Vancomycin IVPB cocurrent with IV 
Folvite with Magnesium (banana bag), but not with Cefepime.

## 2020-09-13 NOTE — NUR
NOTE



S/W MAKENZIE AT [email or web address] WHO INFORMED THIS CM THAT D/T PATIENT HAVING PICC LINE, WILL NOT BE 
ABLE TO ADMIT TO RECUPERATIVE CARE. ADVISED TO FAX CLINICALS FOR SNF PLACEMENT TO F: 
[phone], SNF PLACEMENT DEPT.

-------------------------------------------------------------------------------

Addendum: 09/13/20 at 0920 by SUSSY PENNINGTON LVN LVN

-------------------------------------------------------------------------------

PATRICE ORDER FAXED TO [email or web address] F: [phone]

## 2020-09-13 NOTE — NUR
NURSE NOTES:

Patient in bed, sleeping at this time; easily arousable.  On room air with no signs of 
distress or SOB. STEPHEN PICC line in tact and patent. Bed locked and in lowest position. Bed 
alarm activated. FWW at bedside. Call light in reach. Will continue plan of care.

## 2020-09-14 VITALS — DIASTOLIC BLOOD PRESSURE: 91 MMHG | SYSTOLIC BLOOD PRESSURE: 137 MMHG

## 2020-09-14 VITALS — DIASTOLIC BLOOD PRESSURE: 96 MMHG | SYSTOLIC BLOOD PRESSURE: 158 MMHG

## 2020-09-14 VITALS — SYSTOLIC BLOOD PRESSURE: 149 MMHG | DIASTOLIC BLOOD PRESSURE: 99 MMHG

## 2020-09-14 VITALS — DIASTOLIC BLOOD PRESSURE: 94 MMHG | SYSTOLIC BLOOD PRESSURE: 157 MMHG

## 2020-09-14 VITALS — SYSTOLIC BLOOD PRESSURE: 110 MMHG | DIASTOLIC BLOOD PRESSURE: 84 MMHG

## 2020-09-14 LAB
ADD MANUAL DIFF: NO
ANION GAP SERPL CALC-SCNC: 10 MMOL/L (ref 5–15)
BASOPHILS NFR BLD AUTO: 1.5 % (ref 0–2)
BUN SERPL-MCNC: 15 MG/DL (ref 7–18)
CALCIUM SERPL-MCNC: 9 MG/DL (ref 8.5–10.1)
CHLORIDE SERPL-SCNC: 108 MMOL/L (ref 98–107)
CO2 SERPL-SCNC: 24 MMOL/L (ref 21–32)
CREAT SERPL-MCNC: 1.1 MG/DL (ref 0.55–1.3)
EOSINOPHIL NFR BLD AUTO: 1.8 % (ref 0–3)
ERYTHROCYTE [DISTWIDTH] IN BLOOD BY AUTOMATED COUNT: 14.7 % (ref 11.6–14.8)
HCT VFR BLD CALC: 35.2 % (ref 42–52)
HGB BLD-MCNC: 11.4 G/DL (ref 14.2–18)
LYMPHOCYTES NFR BLD AUTO: 31.5 % (ref 20–45)
MCV RBC AUTO: 94 FL (ref 80–99)
MONOCYTES NFR BLD AUTO: 9 % (ref 1–10)
NEUTROPHILS NFR BLD AUTO: 56.2 % (ref 45–75)
PLATELET # BLD: 346 K/UL (ref 150–450)
POTASSIUM SERPL-SCNC: 4.4 MMOL/L (ref 3.5–5.1)
RBC # BLD AUTO: 3.74 M/UL (ref 4.7–6.1)
SODIUM SERPL-SCNC: 142 MMOL/L (ref 136–145)
WBC # BLD AUTO: 6.2 K/UL (ref 4.8–10.8)

## 2020-09-14 RX ADMIN — VANCOMYCIN HYDROCHLORIDE SCH MLS/HR: 500 INJECTION, POWDER, LYOPHILIZED, FOR SOLUTION INTRAVENOUS at 10:22

## 2020-09-14 RX ADMIN — SODIUM CHLORIDE SCH MLS/HR: 0.9 INJECTION INTRAVENOUS at 05:36

## 2020-09-14 RX ADMIN — HEPARIN SODIUM SCH UNITS: 5000 INJECTION INTRAVENOUS; SUBCUTANEOUS at 08:48

## 2020-09-14 RX ADMIN — FOLIC ACID SCH MLS/HR: 5 INJECTION, SOLUTION INTRAMUSCULAR; INTRAVENOUS; SUBCUTANEOUS at 09:08

## 2020-09-14 RX ADMIN — VANCOMYCIN HYDROCHLORIDE SCH MLS/HR: 500 INJECTION, POWDER, LYOPHILIZED, FOR SOLUTION INTRAVENOUS at 09:07

## 2020-09-14 RX ADMIN — SODIUM CHLORIDE SCH MLS/HR: 0.9 INJECTION INTRAVENOUS at 14:09

## 2020-09-14 RX ADMIN — SODIUM CHLORIDE AND POTASSIUM CHLORIDE SCH MLS/HR: 9; 1.49 INJECTION, SOLUTION INTRAVENOUS at 09:08

## 2020-09-14 NOTE — NUR
NURSE HAND-OFF: 



Important Events on Shift: n/a

Patient Status: Stable

Diet: Regular



Pending Orders: N/A

Pending Results/Labs: CBC, BMP, CRP, SRW, Mag, Phos

Pending MD notification: N/A



Latest Vital Signs: Temperature 98.1 , Pulse 88 , B/P 157 /94 , Respiratory Rate 19 , O2 SAT 
98 , Room Air, O2 Flow Rate 3.0 .  

Vital Sign Comment: 



Latest Brennan Fall Score: 45  

Fall Risk: High Risk 

Safety Measures: Call light Within Reach, Bed Alarm Zone 1, Side Rails Side Rails x2, Bed 
position Low and Locked.

Fall Precautions: 

Yellow Socks

-------------------------------------------------------------------------------

Addendum: 09/14/20 at 0730 by KRISTIE CHEUNG RN

-------------------------------------------------------------------------------

Report given to DA Tellez

## 2020-09-14 NOTE — NUR
CARDIOLOGY

The PATRICE will happen tomorrow 9/14/2020 at [address] with Dr. Atkinson. Patient needs to be 
NPO. Needs good IV.

## 2020-09-14 NOTE — NUR
NURSE NOTES:

patient is being discharged to [facility], report given to Yong ROBERSON. Patient 
instable condition and VS, taken external urinary catheter off. Patient leaves with PICC STEPHEN 
2L as he's supposed to receive IVF and IV antibiotic at the facility. Given report and 
discharge packet to EMT SHANNON Enciso from [facility]. Patient unable to sign but left 
with all his belongings except clothes he disposed off in garbage due to soiling, and 
including unopened medication bag with his own meds. Taken ID band off patient.

## 2020-09-14 NOTE — NUR
NURSE NOTES:

patient refused pictures or dressing change. He remove all optifoams. Pt has DTI on sacral, 
L trochanter, L and R buttocks. No open wound. 

-------------------------------------------------------------------------------

Addendum: 09/14/20 at 1656 by CARRIE BUNDY RN

-------------------------------------------------------------------------------

also R hip DTI refused pic and dressing.

## 2020-09-14 NOTE — INTERNAL MED PROGRESS NOTE
Subjective


Date of Service:  Sep 14, 2020


Physician Name


Don Simmons


Attending Physician


Shakeel Marrufo MD


Allergies:  


Coded Allergies:  


     No Known Allergies (Unverified , 11/18/15)


ROS Limited/Unobtainable:  No


Constitutional:  Reports: no symptoms


HEENT:  Reports: no symptoms


Cardiovascular:  Reports: no symptoms


Respiratory:  Reports: no symptoms


Gastrointestinal/Abdominal:  Reports: no symptoms


Genitourinary:  Reports: no symptoms


Neurologic/Psychiatric:  Reports: no symptoms


Subjective


56 YO M with history of alcohol abuse admitted with cough.  Now UTI.  Cover for 

Int Med- Dr Marrufo.  Transesophageal echocardiogram rescheduled for Tue 9/15/20.

  C/O right side weakness





Objective





Last Vital Signs








  Date Time  Temp Pulse Resp B/P (MAP) Pulse Ox O2 Delivery O2 Flow Rate FiO2


 


9/14/20 16:00 97.7 81 18 149/99 (116) 97   


 


9/14/20 09:00      Room Air  











Laboratory Tests








Test


  9/14/20


05:51


 


White Blood Count


  6.2 K/UL


(4.8-10.8)


 


Red Blood Count


  3.74 M/UL


(4.70-6.10)  L


 


Hemoglobin


  11.4 G/DL


(14.2-18.0)  L


 


Hematocrit


  35.2 %


(42.0-52.0)  L


 


Mean Corpuscular Volume 94 FL (80-99)  


 


Mean Corpuscular Hemoglobin


  30.4 PG


(27.0-31.0)


 


Mean Corpuscular Hemoglobin


Concent 32.3 G/DL


(32.0-36.0)


 


Red Cell Distribution Width


  14.7 %


(11.6-14.8)


 


Platelet Count


  346 K/UL


(150-450)


 


Mean Platelet Volume


  6.4 FL


(6.5-10.1)  L


 


Neutrophils (%) (Auto)


  56.2 %


(45.0-75.0)


 


Lymphocytes (%) (Auto)


  31.5 %


(20.0-45.0)


 


Monocytes (%) (Auto)


  9.0 %


(1.0-10.0)


 


Eosinophils (%) (Auto)


  1.8 %


(0.0-3.0)


 


Basophils (%) (Auto)


  1.5 %


(0.0-2.0)


 


Sodium Level


  142 MMOL/L


(136-145)


 


Potassium Level


  4.4 MMOL/L


(3.5-5.1)


 


Chloride Level


  108 MMOL/L


()  H


 


Carbon Dioxide Level


  24 MMOL/L


(21-32)


 


Anion Gap


  10 mmol/L


(5-15)


 


Blood Urea Nitrogen


  15 mg/dL


(7-18)


 


Creatinine


  1.1 MG/DL


(0.55-1.30)


 


Estimat Glomerular Filtration


Rate > 60 mL/min


(>60)


 


Glucose Level


  83 MG/DL


()


 


Calcium Level


  9.0 MG/DL


(8.5-10.1)

















Intake and Output  


 


 9/13/20 9/14/20





 19:00 07:00


 


Intake Total 1855 ml 


 


Output Total 800 ml 


 


Balance 1055 ml 


 


  


 


Intake Oral 1640 ml 


 


IV Total 215 ml 


 


Output Urine Total 800 ml 


 


# Voids  1


 


# Bowel Movements 3 








Objective


Objective


General: No acute distress, awake and alert


HEENT: NCAT, sclera anicteric, PERRL, EOMI.


Neck: Supple, no significant jugular venous distention, 


Lungs: Good inspiratory effort, no accessory muscle use, clear to auscultation 

bilaterally, no Wheeze or Rales.


Heart: Regular rate and rhythm, normal S1/S2, no murmurs.


Abdomen: soft, nontender, nondistended. Normoactive bowel sounds, mild obesity.


 / Rectal: Refused and deferred.


Extremities: No Cyanosis , clubbing or edema. 


Neuro: A&O x 3, Able to move all extremities


Skin: warm, no rashes or lesions


Psych: Normal mood and affect





Assessment/Plan


ASSESSMENT:


1. Sepsis most likely due to UTI vs SIRS.


2. Leukocytosis with high fever.


3. Hypertension.


4. Alcoholism with alcohol withdrawal.


5. Homeless.


6.  Possible endocarditis


7.  Right side weakness





PLAN:  


in Medical Unit 


Abx: Vancomycin and Cefepime.  


Follow up with cultures, 2D echo, HIV test, RPR, and viral load.  


Dr. Kitchen consultation with Infectious Disease


Dr. Cook with Pulmonary/Critical Care.  


Code status: Full Code.  


DVT prophylaxis: Heparin subcu.


Await transesophageal echocardiogram 9/15/20


MRI brain=normal


Discharge to [facility] today











Don Simmons MD Sep 14, 2020 18:39

## 2020-09-14 NOTE — PULMONOLOGY PROGRESS NOTE
Subjective


ROS Limited/Unobtainable:  No


Allergies:  


Coded Allergies:  


     No Known Allergies (Unverified , 11/18/15)





Objective





Last 24 Hour Vital Signs








  Date Time  Temp Pulse Resp B/P (MAP) Pulse Ox O2 Delivery O2 Flow Rate FiO2


 


9/14/20 09:00      Room Air  


 


9/14/20 08:44  82  137/91    


 


9/14/20 08:43  82  137/91    


 


9/14/20 08:00 97.3 82 20 137/91 (106) 98   


 


9/14/20 04:00 98.1 88 19 157/94 (115) 98   


 


9/14/20 00:00 98.0 72 19 110/84 (93) 99   


 


9/13/20 20:04      Room Air  


 


9/13/20 20:00 97.9 78 19 114/91 (99) 98   


 


9/13/20 18:02  69  137/91    


 


9/13/20 16:00 97.3 69 19 137/91 (106) 100   

















Intake and Output  


 


 9/13/20 9/14/20





 19:00 07:00


 


Intake Total 1640 ml 


 


Output Total 800 ml 


 


Balance 840 ml 


 


  


 


Intake Oral 1640 ml 


 


Output Urine Total 800 ml 


 


# Voids  1


 


# Bowel Movements 3 








General Appearance:  WD/WN


HEENT:  normocephalic, anicteric


Respiratory:  chest wall non-tender, lungs clear


Cardiovascular:  normal peripheral pulses, normal rate


Abdomen:  normal bowel sounds, soft, non tender


Genitourinary:  normal external genitalia


Extremities:  no cyanosis


Skin:  no rash


Neurologic:  CNs II-XII grossly normal


Lymphatic:  no neck adenopathy


Laboratory Tests


9/14/20 05:51: 


White Blood Count 6.2, Red Blood Count 3.74L, Hemoglobin 11.4L, Hematocrit 35.2L

, Mean Corpuscular Volume 94, Mean Corpuscular Hemoglobin 30.4, Mean 

Corpuscular Hemoglobin Concent 32.3, Red Cell Distribution Width 14.7, Platelet 

Count 346, Mean Platelet Volume 6.4L, Neutrophils (%) (Auto) 56.2, Lymphocytes (

%) (Auto) 31.5, Monocytes (%) (Auto) 9.0, Eosinophils (%) (Auto) 1.8, Basophils 

(%) (Auto) 1.5, Sodium Level 142, Potassium Level 4.4, Chloride Level 108H, 

Carbon Dioxide Level 24, Anion Gap 10, Blood Urea Nitrogen 15, Creatinine 1.1, 

Estimat Glomerular Filtration Rate > 60, Glucose Level 83, Calcium Level 9.0





Current Medications








 Medications


  (Trade)  Dose


 Ordered  Sig/Yeison


 Route


 PRN Reason  Start Time


 Stop Time Status Last Admin


Dose Admin


 


 Acetaminophen


  (Tylenol)  650 mg  Q4H  PRN


 ORAL


 fever  9/4/20 16:51


 10/4/20 16:50  9/6/20 11:25


 


 


 Amlodipine


 Besylate


  (Norvasc)  10 mg  DAILY


 ORAL


   9/5/20 12:45


 10/5/20 12:44  9/14/20 08:44


 


 


 Cefepime HCl 1 gm/


 Dextrose  55 ml @ 


 110 mls/hr  EVERY 8  HOURS


 IVPB


   9/11/20 22:00


 9/18/20 21:59  9/14/20 05:36


 


 


 Chlorhexidine


 Gluconate


  (Alysha-Hex 2%)  1 applic  DAILY@2000


 TOPIC


   9/10/20 20:00


 12/9/20 19:59  9/12/20 21:06


 


 


 Dextrose


  (Dextrose 50%)  25 ml  Q30M  PRN


 IV


 Hypoglycemia  9/4/20 16:52


 12/3/20 16:51   


 


 


 Dextrose


  (Dextrose 50%)  50 ml  Q30M  PRN


 IV


 Hypoglycemia  9/4/20 16:52


 12/3/20 16:51   


 


 


 Folic Acid 1 mg/


 Magnesium Sulfate


 2000 mg/


 Multivitamins 10


 ml/Potassium


 Chloride/Sodium


 Chloride  1,014.2 ml


  @ 124.876


 mls/hr  Q24H


 IV


   9/5/20 09:00


 10/4/20 08:59  9/14/20 09:08


 


 


 Heparin Sodium


  (Porcine)


  (Heparin 5000


 units/ml)  5,000 units  EVERY 12  HOURS


 SUBQ


   9/4/20 21:00


 10/19/20 08:59  9/14/20 08:48


 


 


 Labetalol HCl


  (Normodyne)  400 mg  TID


 ORAL


   9/5/20 13:00


 10/5/20 12:59  9/14/20 08:43


 


 


 Ondansetron HCl


  (Zofran)  4 mg  Q6H  PRN


 IVP


 Nausea & Vomiting  9/4/20 16:52


 10/4/20 16:51   


 


 


 Polyethylene


 Glycol


  (Miralax)  17 gm  HSPRN  PRN


 ORAL


 Constipation  9/4/20 16:52


 10/4/20 16:51   


 


 


 Thiamine HCl 100


 mg/Dextrose  56 ml @ 


 112 mls/hr  Q24H


 IVPB


   9/5/20 09:00


 10/4/20 08:59  9/14/20 09:08


 


 


 Vancomycin HCl


  (Vanco pharmacy


 to dose)  1 ea  DAILY  PRN


 MISC


 Per rx protocol  9/5/20 09:00


 10/4/20 10:29   


 


 


 Vancomycin HCl


 500 mg/Dextrose  110 ml @ 


 110 mls/hr  Q12HR@1000,2200


 IVPB


   9/10/20 10:00


 9/15/20 09:59  9/14/20 10:22


 











Assessment/Plan


Problems:  


(1) Sepsis


(2) Alcohol withdrawal


(3) History of hypertension


(4) ETOH abuse


(5) Homelessness


Assessment/Plan


waiting for placement


walking in the hallway


afebrile


monitor electrolytes


pt ot


wound care











Jessy Cook MD Sep 14, 2020 12:40

## 2020-09-14 NOTE — NUR
DISCHARGE PLANNING



S/W [facility] [phone] EXT [phone]. CONFIRMED PATIENT ACCEPTED TO 



[facility]

[address] [place] [address]

[phone]



ROOM [address]

SKILLED



PER [facility], OUTPATIENT PATRICE WILL BE SCHEDULED AND PATIENT UPDATED AT SNF. TRANSPORTATION TO 
SNF TO BE COORDINATED WITH 



[facility] 



[phone]

AUTH# [id number]



CALL MADE TO [facility]. 

[facility] TRANSPORTATION SCHEDULED WITH ETA @ 6280 PER KATLYN BROOKS

## 2020-09-14 NOTE — ANETHESIA PREOPERATIVE EVAL
Anesthesia Pre-op PMH/ROS


General


Date of Evaluation:  Sep 14, 2020


Time of Evaluation:  16:49


Anesthesiologist:  Jacqueline


ASA Score:  ASA 4


Mallampati Score


Class I : Soft palate, uvula, fauces, pillars visible


Class II: Soft palate, uvula, fauces visible


Class III: Soft palate, base of uvula visible


Class IV: Only hard plate visible


Mallampati Classification:  Class II


Surgeon:  China


Diagnosis:  Sepsis


Surgical Procedure:  PATRICE


Social History:  alcohol use - Abuse


Family History:  no anesthesia problems


Allergies:  


Coded Allergies:  


     No Known Allergies (Unverified , 11/18/15)


Medications:  see eMAR


Patient NPO?:  Yes





Past Medical History


Cardiovascular:  Reports: HTN


Hematology/Immune:  Reports: anemia, other - Sepsis


Musculoskeletal/Integumentary:  Reports: other - Homeless





Anesthesia Pre-op Phys. Exam


Physician Exam





Last Vital Signs








  Date Time  Temp Pulse Resp B/P (MAP) Pulse Ox O2 Delivery O2 Flow Rate FiO2


 


9/14/20 16:00 97.7 81 18 149/99 (116) 97   


 


9/14/20 09:00      Room Air  








Constitutional:  NAD


Neurologic:  CN 2-12 intact


Cardiovascular:  RRR


Respiratory:  CTA


Gastrointestinal:  S/NT/ND





Airway Exam


Mallampati Score:  Class II


MO:  full


ROM:  limited


Teeth:  missing





Anesthesia Pre-op A/P


Labs





Hematology








Test


  9/14/20


05:51


 


White Blood Count


  6.2 K/UL


(4.8-10.8)


 


Red Blood Count


  3.74 M/UL


(4.70-6.10)  L


 


Hemoglobin


  11.4 G/DL


(14.2-18.0)  L


 


Hematocrit


  35.2 %


(42.0-52.0)  L


 


Mean Corpuscular Volume 94 FL (80-99)  


 


Mean Corpuscular Hemoglobin


  30.4 PG


(27.0-31.0)


 


Mean Corpuscular Hemoglobin


Concent 32.3 G/DL


(32.0-36.0)


 


Red Cell Distribution Width


  14.7 %


(11.6-14.8)


 


Platelet Count


  346 K/UL


(150-450)


 


Mean Platelet Volume


  6.4 FL


(6.5-10.1)  L


 


Neutrophils (%) (Auto)


  56.2 %


(45.0-75.0)


 


Lymphocytes (%) (Auto)


  31.5 %


(20.0-45.0)


 


Monocytes (%) (Auto)


  9.0 %


(1.0-10.0)


 


Eosinophils (%) (Auto)


  1.8 %


(0.0-3.0)


 


Basophils (%) (Auto)


  1.5 %


(0.0-2.0)








Chemistry








Test


  9/14/20


05:51


 


Sodium Level


  142 MMOL/L


(136-145)


 


Potassium Level


  4.4 MMOL/L


(3.5-5.1)


 


Chloride Level


  108 MMOL/L


()  H


 


Carbon Dioxide Level


  24 MMOL/L


(21-32)


 


Anion Gap


  10 mmol/L


(5-15)


 


Blood Urea Nitrogen


  15 mg/dL


(7-18)


 


Creatinine


  1.1 MG/DL


(0.55-1.30)


 


Estimat Glomerular Filtration


Rate > 60 mL/min


(>60)


 


Glucose Level


  83 MG/DL


()


 


Calcium Level


  9.0 MG/DL


(8.5-10.1)











Risk Assessment & Plan


Assessment:


ASA 4


Plan:


GA


Status Change Before Surgery:  No





Pre-Antibiotics


Drug:  Gaurang Echevarria MD Sep 14, 2020 16:43

## 2020-09-14 NOTE — NUR
NURSE NOTES:

External urinary catheter got displaced, nurse replaced it with a medium size.

-------------------------------------------------------------------------------

Addendum: 09/14/20 at 1430 by CARRIE BUNDY RN

-------------------------------------------------------------------------------

patient's milvia blue pants got soiled, patient asked nurse to throw it away. Nurse checked, 
nothing inside pockets, and disposed of the pants in trash container. Updated the belongings 
list.

## 2020-09-14 NOTE — INFECTIOUS DISEASES PROG NOTE
Assessment/Plan








Assessment:


COVID19 neg x2 (9/3 rapid PCR neg; 9/4 SARS-COV2 PCR neg)





r/o PRobable endocarditis (severe sepsis on admission, questionable vegetations 

seen on 2d echo)


has R side weakness- ?etiology - MRI brain neg for CVA


    -MRI Brain: Chronic and age-related changes. Negative for acute 

intracranial bleed, mass effect, infarct, or contrast enhancing lesion. No 

venous or dural sinus abnormality demonstrated 


   -2d echo: possible discrete vegetation on MV





Severe Sepsis, SP


   9/4 ua wbc 60-80, nit neg, leuk +1; ucx neg


         sp cx p


    UCx 9/3/20 - GPC < 10, 000; u/a wbc 10-15, nit neg, leuk neg


   9/3 CXR: no acute process





High fever, SP


Leukocytosis, resolved


   -u/a wbc 10-15, nit neg, leuk est neg


   -CXR  report pending (per ER interpretation: no consolidation, no effusion, 

no pneumothorax, no acute cardiopulmonary disease)








Diphteroids bactremia- ?contaminant


   -9/3 Bcx 1/4 diphteroids; 9/4 Bcx NTD; 9/8 Bcx NTD





Alcohol withdrawal


   -9/6 CT head:  Age-related changes with no acute intracranial findings.


 


Multiple skin abrasions


   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





HTN


homelessness


ETOH abuse





HIV ab screen (Neg)


RPR neg





Plan:


-On empiric IV Vancomycin #11 and  Cefepime #10- will continue until PATRICE is 

done  


    --expected end date is 10/16/20 for endocarditis; weekly CBC, CMP, vanco 

through. Insurance did not approve PATRICE to be done here so patient will be 

discharge to SNF and insurance company will arrange for PATRICE to be done at 

[facility]. Order for PATRICE was done and faxed to respective entity





    - 9/4/20 SP Amikacin #1





-f/u wound cx


-Monitor CBC/CMP, temperatures


-COVID19 neg x2


-Cdiff if diarrhea








Thank  you for consulting [facility].  Will continue to follow along with 

you.





Discussed with RN





Subjective


Allergies:  


Coded Allergies:  


     No Known Allergies (Unverified , 11/18/15)


afebrile


no leuokcytosis


latest Bcx NTD


discharge planning





Objective





Last 24 Hour Vital Signs








  Date Time  Temp Pulse Resp B/P (MAP) Pulse Ox O2 Delivery O2 Flow Rate FiO2


 


9/14/20 14:09  79  158/96    


 


9/14/20 12:00 98.4 79 20 158/96 (116) 98   


 


9/14/20 09:00      Room Air  


 


9/14/20 08:44  82  137/91    


 


9/14/20 08:43  82  137/91    


 


9/14/20 08:00 97.3 82 20 137/91 (106) 98   


 


9/14/20 04:00 98.1 88 19 157/94 (115) 98   


 


9/14/20 00:00 98.0 72 19 110/84 (93) 99   


 


9/13/20 20:04      Room Air  


 


9/13/20 20:00 97.9 78 19 114/91 (99) 98   


 


9/13/20 18:02  69  137/91    


 


9/13/20 16:00 97.3 69 19 137/91 (106) 100   








Height (Feet):  5


Height (Inches):  7.00


Weight (Pounds):  160


General Appearance:  WD/WN


HEENT:  normocephalic, anicteric


Respiratory:  chest wall non-tender, lungs clear


Cardiovascular:  normal peripheral pulses, normal rate


Abdomen:  normal bowel sounds, soft, non tender


Extremities:  no cyanosis


Skin:  no rash





Laboratory Tests








Test


  9/14/20


05:51


 


White Blood Count


  6.2 K/UL


(4.8-10.8)


 


Red Blood Count


  3.74 M/UL


(4.70-6.10)  L


 


Hemoglobin


  11.4 G/DL


(14.2-18.0)  L


 


Hematocrit


  35.2 %


(42.0-52.0)  L


 


Mean Corpuscular Volume 94 FL (80-99)  


 


Mean Corpuscular Hemoglobin


  30.4 PG


(27.0-31.0)


 


Mean Corpuscular Hemoglobin


Concent 32.3 G/DL


(32.0-36.0)


 


Red Cell Distribution Width


  14.7 %


(11.6-14.8)


 


Platelet Count


  346 K/UL


(150-450)


 


Mean Platelet Volume


  6.4 FL


(6.5-10.1)  L


 


Neutrophils (%) (Auto)


  56.2 %


(45.0-75.0)


 


Lymphocytes (%) (Auto)


  31.5 %


(20.0-45.0)


 


Monocytes (%) (Auto)


  9.0 %


(1.0-10.0)


 


Eosinophils (%) (Auto)


  1.8 %


(0.0-3.0)


 


Basophils (%) (Auto)


  1.5 %


(0.0-2.0)


 


Sodium Level


  142 MMOL/L


(136-145)


 


Potassium Level


  4.4 MMOL/L


(3.5-5.1)


 


Chloride Level


  108 MMOL/L


()  H


 


Carbon Dioxide Level


  24 MMOL/L


(21-32)


 


Anion Gap


  10 mmol/L


(5-15)


 


Blood Urea Nitrogen


  15 mg/dL


(7-18)


 


Creatinine


  1.1 MG/DL


(0.55-1.30)


 


Estimat Glomerular Filtration


Rate > 60 mL/min


(>60)


 


Glucose Level


  83 MG/DL


()


 


Calcium Level


  9.0 MG/DL


(8.5-10.1)











Current Medications








 Medications


  (Trade)  Dose


 Ordered  Sig/Yeison


 Route


 PRN Reason  Start Time


 Stop Time Status Last Admin


Dose Admin


 


 Acetaminophen


  (Tylenol)  650 mg  Q4H  PRN


 ORAL


 fever  9/4/20 16:51


 10/4/20 16:50  9/6/20 11:25


 


 


 Amlodipine


 Besylate


  (Norvasc)  10 mg  DAILY


 ORAL


   9/5/20 12:45


 10/5/20 12:44  9/14/20 08:44


 


 


 Cefepime HCl 1 gm/


 Dextrose  55 ml @ 


 110 mls/hr  EVERY 8  HOURS


 IVPB


   9/11/20 22:00


 9/18/20 21:59  9/14/20 14:09


 


 


 Chlorhexidine


 Gluconate


  (Alysha-Hex 2%)  1 applic  DAILY@2000


 TOPIC


   9/10/20 20:00


 12/9/20 19:59  9/12/20 21:06


 


 


 Dextrose


  (Dextrose 50%)  25 ml  Q30M  PRN


 IV


 Hypoglycemia  9/4/20 16:52


 12/3/20 16:51   


 


 


 Dextrose


  (Dextrose 50%)  50 ml  Q30M  PRN


 IV


 Hypoglycemia  9/4/20 16:52


 12/3/20 16:51   


 


 


 Folic Acid 1 mg/


 Magnesium Sulfate


 2000 mg/


 Multivitamins 10


 ml/Potassium


 Chloride/Sodium


 Chloride  1,014.2 ml


  @ 124.876


 mls/hr  Q24H


 IV


   9/5/20 09:00


 10/4/20 08:59  9/14/20 09:08


 


 


 Heparin Sodium


  (Porcine)


  (Heparin 5000


 units/ml)  5,000 units  EVERY 12  HOURS


 SUBQ


   9/4/20 21:00


 10/19/20 08:59  9/14/20 08:48


 


 


 Labetalol HCl


  (Normodyne)  400 mg  TID


 ORAL


   9/5/20 13:00


 10/5/20 12:59  9/14/20 14:09


 


 


 Ondansetron HCl


  (Zofran)  4 mg  Q6H  PRN


 IVP


 Nausea & Vomiting  9/4/20 16:52


 10/4/20 16:51   


 


 


 Polyethylene


 Glycol


  (Miralax)  17 gm  HSPRN  PRN


 ORAL


 Constipation  9/4/20 16:52


 10/4/20 16:51   


 


 


 Thiamine HCl 100


 mg/Dextrose  56 ml @ 


 112 mls/hr  Q24H


 IVPB


   9/5/20 09:00


 10/4/20 08:59  9/14/20 09:08


 


 


 Vancomycin HCl


  (Vanco pharmacy


 to dose)  1 ea  DAILY  PRN


 MISC


 Per rx protocol  9/5/20 09:00


 10/4/20 10:29   


 


 


 Vancomycin HCl


 500 mg/Dextrose  110 ml @ 


 110 mls/hr  Q12HR@1000,2200


 IVPB


   9/10/20 10:00


 9/15/20 09:59  9/14/20 10:22


 

















Heidi Kitchen M.D. Sep 14, 2020 15:03

## 2020-09-14 NOTE — NUR
NURSE NOTES:

received patient lying in bed, awake, oriented, no complaint of pain or discomfort. In room 
air, no respiratory distress noted. Patient has IV central access PICC BEATRIZ MITCHELL. Bed locked 
at the lowest position possible, call light within easy reach, side rails up x2. External 
urinary catheter in place,draining yellow urine to gravity. Will continue to monitor patient 
and follow up with the plan of care.

## 2020-09-15 NOTE — DISCHARGE SUMMARY
Discharge Summary


Discharge Summary


_


DATE OF ADMISSION: []


9/3/2020


DATE OF DISCHARGE: [] 9/14/2020 Dr. Marrufo








DISCHARGED BY: 





REASON FOR ADMISSION: 


[] 55 years old male with past medical history of hypertension, alcohol abuse, 

homeless, presented to emergency department via paramedics.  Patient was found 

on the street with tremors and unsteady gait.  Patient was just discharged from 

the [facility] day 2 ago for the same reason.  Shortly after initial 

evaluation emergency department patient was noted to have a fever of 104.3 

subsequently patient admitted to ICU for possible sepsis laboratory work-up was 

significant for leukocytosis WBC 17.7 hemoglobin 12.1 hematocrit 36.1 platelet 

count 258.  Stable electrolytes.  And renal parameters.  Glucose 114.  Lactic 

acid 0.5.  AST 46 ALT 40.  Troponin negative.  Urinalysis revealed +1 protein 

pyuria occasional bacteria.  Rapid COVID-19 was negative.  EKG revealed sinus 

tachycardia with heart rate 132 no acute ischemic changes troponin was negative 

chest x-ray revealed no acute cardiopulmonary pathology.  Serum alcohol level 

was less than 3.


In emergency department patient received IV fluids pancultured started on 

empiric antibiotic and admitted for further management.


CONSULTANTS:


cardiologist


neurologist


pulmonary/critical care Sandra Robison


ID specialist Dr. Kitchen


GI specialist


nephrologist


hematologist/oncologist


surgery


psychiatrist


 


HOSPITAL COURSE: 


[] Patient admitted to ICU.  Patient was on IV fluids with vitamins and 

minerals.  Seen withdrawal precaution maintained.  Patient started on empiric 

antibiotics ID specialist and critical care specialist closely followed.  DVT 

prophylaxis provided.  HIV test was nonreactive GC/CT negative, RPR 

nonreactive.  Hepatitis panel positive for hepatitis C.  Blood culture revealed 

diphtheroids contaminant as per ID recommendation.  Urine culture revealed gram-

positive cocci with colony count less than 10K.  Repeated blood culture 9 4 

blood negative repeated urine culture 9 4 were negative.  Then urine culture 9 

5 revealed yeast and final blood culture 984-.  Coronavirus was leukocytosis 

and fever resolved.


Echocardiogram demonstrated preserved ejection fraction of 65%.  No evidence of 

left ventricular hypertrophy.  No evidence of pericardial effusion.  No 

evidence of wall motion abnormality.  Echodensity noted on mitral valve leaflet 

suggestive of vegetation..


Per ID specialist patient have probable endocarditis with severe sepsis on 

admission since questionable vegetation seen on echo.  


Antibiotics were provided as per ID recommendation.  Insurance did not approve 

PATRICE to be done at this facility.


Blood pressure was managed with calcium channel blocker and beta-blocker.  DVT 

prophylaxis provided.  Supportive care provided.











CT of the head revealed no acute intracranial finding age-related changes 

noted.  Subsequently MRI of the brain was done which revealed chronic and age-

related changes again negative for acute intracranial bleeding mass-effect 

effect infarct or lesion.  No venous or dural sinus abnormalities were 

demonstrated.  Leukocytosis resolved fevers resolved.  Patient was on empiric 

antibiotics PICC line was inserted to continue antibiotic until PATRICE will be 

done back to the end of treatment 1016 for endocarditis with weekly CBC CMP and 

Vanco trough level.  Unfortunately insurance did not approve PATRICE to be done at 

this facility therefore plan was to discharge plan patient was discharged to 

skilled nursing facility, and insurance company will arrange for PATRICE to be done 

at [facility].





FINAL DIAGNOSES: 


Severe sepsis possibly due to  endocarditis 


Presumed endocarditis


Hypertension


ETOH abuse with alcohol withdrawal


Homeless


Hepatitis C


Right-sided weakness


Mild protein calorie malnutrition





DISCHARGE MEDICATIONS:


See Medication Reconciliation list.





DISCHARGE INSTRUCTIONS:


Patient was discharged to the skilled nursing facility. 


Follow up with medical doctor at the facility.





I have been assigned to dictate discharge summary for this account. 


I was not involved in the patient's management.











Sasha Rod NP Sep 15, 2020 11:11

## 2021-12-30 NOTE — DIAGNOSTIC IMAGING REPORT
Indication: Back pain

 

Technique: Continuous helical transaxial imaging of the thoracic spine was obtained

from the lung bases to the pubic symphysis.  No IV contrast was administered. 

Coronal 2-D reformats were also obtained. Study obtained in a Siemens sensation 64

slice CT. 

 

Total Dose length Product (DLP):  635 mGycm

 

CT Dose Index Volume (CTDIvol):   13.4 mGy

 

 

Comparison: None

 

Findings: 

 

There is an acute appearing nondisplaced fracture of the left transverse process of

T2. No other fractures are appreciated. There is partial visualization of hardware

within the posterior part of the lower cervical spine including pedicle screws and

fusion rods as well as multilevel laminectomy. Vertebral endplate osteophytes

demonstrated throughout the thoracic spine. There is ascites kyphosis of the upper

thoracic/lower cervical spine. Aorta is moderately calcified.

 

IMPRESSION:

 

Apparent fracture of the left T2 transverse process. This may be acute. Correlate

clinically.

 

Status post laminectomy and posterior fusion lower cervical spine.

 

Degenerative changes of the thoracic spine as described above.

 

Arterial vascular disease.

 

[facility] has communicated the preliminary results to the Emergency

Department.  Their findings are largely concordant with this report.

 

 

 

The CT scanner at [facility] is accredited by the [employer]

[employer] and the scans are performed using dose optimization techniques as

appropriate to a performed exam including Automatic Exposure control. Yes...

## 2023-10-16 NOTE — HISTORY & PHYSICAL
Called patient, no answer, left message   Need to schedule with AJITH  
History and Physical


History & Physicial


Job # [id number]











Shakeel Marrufo MD Sep 4, 2020 21:08
no